# Patient Record
Sex: MALE | Race: WHITE | Employment: OTHER | URBAN - METROPOLITAN AREA
[De-identification: names, ages, dates, MRNs, and addresses within clinical notes are randomized per-mention and may not be internally consistent; named-entity substitution may affect disease eponyms.]

---

## 2020-06-04 ENCOUNTER — OFFICE VISIT (OUTPATIENT)
Dept: NEUROLOGY | Facility: CLINIC | Age: 75
End: 2020-06-04

## 2020-06-04 DIAGNOSIS — R03.0 ELEVATED BLOOD PRESSURE READING WITHOUT DIAGNOSIS OF HYPERTENSION: ICD-10-CM

## 2020-06-04 DIAGNOSIS — E78.00 HIGH BLOOD CHOLESTEROL: ICD-10-CM

## 2020-06-04 DIAGNOSIS — G25.0 ESSENTIAL TREMOR: Primary | ICD-10-CM

## 2020-06-04 DIAGNOSIS — E74.39 GLUCOSE INTOLERANCE: ICD-10-CM

## 2020-06-04 DIAGNOSIS — R25.1 TREMOR: ICD-10-CM

## 2020-06-04 RX ORDER — ENALAPRIL MALEATE 2.5 MG/1
2.5 TABLET ORAL EVERY MORNING
COMMUNITY

## 2020-06-04 RX ORDER — ATORVASTATIN CALCIUM 10 MG/1
10 TABLET, FILM COATED ORAL EVERY EVENING
COMMUNITY

## 2020-06-04 NOTE — PROGRESS NOTES
I changed the patient's scheduled in-person visit to a remote visit  because of the COVID19 crisis.  The rationale was to protect the patient from unnecessary interpersonal contact.    Referral source: Self-referred    Chief complaint: Essential tremor    History of present illness:  Mr. Duane Faymoville  is a 74-year-old right-handed retired gentleman who I met with remotely to discuss his interest in deep brain stimulation for essential tremor.    Mr. Bang cannot think of any parents, grandparents or siblings who had essential tremor.  His father  at a young age of 56.  He did not have tremor at that time.  None of his siblings have tremor. One of his sons does have tremor in the hands that began at age 30.    Ten years ago he began having a postural action tremor of his right arm.  This would increase with stress, fatigue, or late in the day.  He does not recognize head tremor.  He does have some voice tremor.  There is no tremor in the legs.  There is no alcohol alleviating affect.    He is gotten by with using strategies.  Handwriting is severely affected by tremor but he presses down hard and squeezes the pen and postures his hand to help with writing.  At times he may hold the right hand with the left.  His tremor is very prominent if he is eating and there are strangers about.  It is much worse if he feels people are paying attention to his tremor.  He finds that if he is talking and making gestures with his arm in social conversation his right arm will shake.  The tremor is now becoming disabling.  He has difficulty drinking from a cup and uses two hands.  He finds it hard to hold things.  He is very active and works on projects and is hard for him to hold a screwdriver or do manual projects with his right hand.  In eating he has difficulty using a fork.  He has moderate difficulty eating soup with a spoon.  He has difficulty putting a key in a lock.  He has a lot of difficulty buttoning clothing.  " He has difficulty picking up change from a vendor.  He says at this point the right arm tremor is disabling.  He states that he is strongly right-handed and has difficulty doing things now because of the right hand tremor.  Left hand tremor started a few years ago and is starting to increase but he does not find this causes disability.  He would be happy if his right arm was tremor free.  He says that he is \"sick of the tremor\".    He and his wife have read up on deep brain stimulation and are are interested in pursuing this.   is a good friend of mine and I have been discussing the possibility with him.  He says he is now at a point where he is interested in pursuing this.    In terms of parkinsonism he has no resting tremor.  His gait is normal.  His handwriting is somewhat small but it is because of the compensation where he squeezes his pencil tightly.  He really has no evidence of parkinsonism.    He has no cognitive difficulties.  There is no speech difficulty other than the mild tremor.  He is otherwise quite healthy.    Past medical history:  Mild glucose intolerance with diagnosis of diabetes mellitus 2  Mild hypertension  Mild hyperlipidemia    Surgeries  Right shoulder rotator cuff repair with good results.  Left arm fracture 2008  Herniorrhaphy  Compression fracture of back with negative biopsy at [facility]    Medications:  Metformin 500 mg a day  Enalapril 2.5 mg a day   Atorvastatin 10 mg a day    Allergies:  Pollens    Anesthetic events:  None    Family history:  Liver cancer Sister  Late life dementia mother    Physical exam:  With posture there is very little arm tremor.  However when the patient engages in kinetic tasks such as holding a cup or handwriting the tremor in the right hand becomes 3+.  In the left hand it is 2+.  The arm tremor seems somewhat postural when he holds his hand in a flexed posture more towards his face the tremor becomes 2+ on the right and 1+ on " the left.    There is no resting tremor.  There is no bradykinesia.    There is some mild vocal tremor.    With handwriting the tremor is 3+ using the right hand.  With spiral drawing the tremor is 3+ on the right and 1+ on the left.    Impression:  1.  Essential (familial) tremor  2.  Mild essential hypertension  3.  Mild diabetes mellitus 2  4.  History of mild hyperlipidemia    Recommendations:  1.  Mr. Bang and I had a long discussion about deep brain stimulation.  We talked about the probable results and relatively low risk.  We talked about the different devices and companies that make the devices and the pros and cons.  He will discuss this further with our neurosurgeon.  2.  He wouldt be a good candidate for left brain/right body DBS to see how he does with improved tremor in his strongly dominant right arm.  At present he does not feel that he would get much additional benefit with treatment of the left arm.  He might be a wait and see candidate.  3.  We discussed the DBS work-up but I will let Ms. Lisha Rodriguez RN, know that should we should start this.  I think he and his wife would like to attend the DBS class when it becomes available or if it becomes available online.    I will be available to Mr. Bang for any questions during his DBS work-up.  He notes at present this will likely involve a video visit with the neurosurgeon and a video visit with Dr. Chiara Haque.    Winston Dey MD

## 2020-06-04 NOTE — LETTER
Date:June 18, 2020      Patient was self referred, no letter generated. Do not send.        [facility] Information

## 2020-06-04 NOTE — LETTER
2020       RE: Duane Faymoville  [address] [place] [address]     Dear Colleague,    Thank you for referring your patient, Duane Faymoville, to the [facility] NEUROLOGY at [facility]. Please see a copy of my visit note below.    I changed the patient's scheduled in-person visit to a remote visit  because of the COVID19 crisis.  The rationale was to protect the patient from unnecessary interpersonal contact.    Referral source: Self-referred    Chief complaint: Essential tremor    History of present illness:  Mr. Duane  is a 74-year-old right-handed retired gentleman who I met with remotely to discuss his interest in deep brain stimulation for essential tremor.    Mr. Bang cannot think of any parents, grandparents or siblings who had essential tremor.  His father  at a young age of 56.  He did not have tremor at that time.  None of his siblings have tremor. One of his sons does have tremor in the hands that began at age 30.    Ten years ago he began having a postural action tremor of his right arm.  This would increase with stress, fatigue, or late in the day.  He does not recognize head tremor.  He does have some voice tremor.  There is no tremor in the legs.  There is no alcohol alleviating affect.    He is gotten by with using strategies.  Handwriting is severely affected by tremor but he presses down hard and squeezes the pen and postures his hand to help with writing.  At times he may hold the right hand with the left.  His tremor is very prominent if he is eating and there are strangers about.  It is much worse if he feels people are paying attention to his tremor.  He finds that if he is talking and making gestures with his arm in social conversation his right arm will shake.  The tremor is now becoming disabling.  He has difficulty drinking from a cup and uses two hands.  He finds it hard to hold things.  He is very active and  "works on projects and is hard for him to hold a screwdriver or do manual projects with his right hand.  In eating he has difficulty using a fork.  He has moderate difficulty eating soup with a spoon.  He has difficulty putting a key in a lock.  He has a lot of difficulty buttoning clothing.  He has difficulty picking up change from a vendor.  He says at this point the right arm tremor is disabling.  He states that he is strongly right-handed and has difficulty doing things now because of the right hand tremor.  Left hand tremor started a few years ago and is starting to increase but he does not find this causes disability.  He would be happy if his right arm was tremor free.  He says that he is \"sick of the tremor\".    He and his wife have read up on deep brain stimulation and are are interested in pursuing this.   is a good friend of mine and I have been discussing the possibility with him.  He says he is now at a point where he is interested in pursuing this.    In terms of parkinsonism he has no resting tremor.  His gait is normal.  His handwriting is somewhat small but it is because of the compensation where he squeezes his pencil tightly.  He really has no evidence of parkinsonism.    He has no cognitive difficulties.  There is no speech difficulty other than the mild tremor.  He is otherwise quite healthy.    Past medical history:  Mild glucose intolerance with diagnosis of diabetes mellitus 2  Mild hypertension  Mild hyperlipidemia    Surgeries  Right shoulder rotator cuff repair with good results.  Left arm fracture 2008  Herniorrhaphy  Compression fracture of back with negative biopsy at [facility]    Medications:  Metformin 500 mg a day  Enalapril 2.5 mg a day   Atorvastatin 10 mg a day    Allergies:  Pollens    Anesthetic events:  None    Family history:  Liver cancer Sister  Late life dementia mother    Physical exam:  With posture there is very little arm tremor.  However when " the patient engages in kinetic tasks such as holding a cup or handwriting the tremor in the right hand becomes 3+.  In the left hand it is 2+.  The arm tremor seems somewhat postural when he holds his hand in a flexed posture more towards his face the tremor becomes 2+ on the right and 1+ on the left.    There is no resting tremor.  There is no bradykinesia.    There is some mild vocal tremor.    With handwriting the tremor is 3+ using the right hand.  With spiral drawing the tremor is 3+ on the right and 1+ on the left.    Impression:  1.  Essential (familial) tremor  2.  Mild essential hypertension  3.  Mild diabetes mellitus 2  4.  History of mild hyperlipidemia    Recommendations:  1.  Mr. Bang and I had a long discussion about deep brain stimulation.  We talked about the probable results and relatively low risk.  We talked about the different devices and companies that make the devices and the pros and cons.  He will discuss this further with our neurosurgeon.  2.  He wouldt be a good candidate for left brain/right body DBS to see how he does with improved tremor in his strongly dominant right arm.  At present he does not feel that he would get much additional benefit with treatment of the left arm.  He might be a wait and see candidate.  3.  We discussed the DBS work-up but I will let Ms. Lisha Rodriguez RN, know that should we should start this.  I think he and his wife would like to attend the DBS class when it becomes available or if it becomes available online.    I will be available to Mr. Bang for any questions during his DBS work-up.  He notes at present this will likely involve a video visit with the neurosurgeon and a video visit with Dr. Chiara Haque.    Winston Dey MD    Again, thank you for allowing me to participate in the care of your patient.      Sincerely,    Winston Dey MD

## 2020-07-16 ENCOUNTER — PATIENT OUTREACH (OUTPATIENT)
Dept: NEUROSURGERY | Facility: CLINIC | Age: 75
End: 2020-07-16

## 2020-07-16 DIAGNOSIS — R25.1 TREMOR: Primary | ICD-10-CM

## 2020-07-16 NOTE — PROGRESS NOTES
Called pt to discuss scheduling DBS workup but got his VM. Left a message with my direct number and will try reaching pt tomorrow.     Spoke with pt to discuss the purpose of each of the workup appts and to discuss dates/times. August 3 and August 5 work well for neurosurgery consult, tremor rating scales, and MRI. I let pt know that the neuropsychological evaluation will be broken up into 2 parts: the interview portion with Dr. Haque, which can be done via a video visit, and the pen and paper testing with the psychometrist, which must be done in the clinic. Pt will get a call from the schedulers to schedule the 2 portions of this evaluation.     Will send a letter with full itinerary once appointments are scheduled. Pt is welcome to call with any questions or concerns. He has my direct number.

## 2020-07-16 NOTE — PROGRESS NOTES
Spoke with pt to discuss potential dates for DBS workup appts. However, he was driving and it was not a good time for him to talk. He did pull over and I gave him my direct contact information. He will call me when he is able to talk and look at his calendar.     I have the following dates in mind:    8/3/20 - consult with Dr. Mckeon (morning if pt prefers in person, afternoon if he prefers video)    8/5/20 at 9:00 - 10:10 - Tremor rating scales with Debbie Dagmar    MRI - will try to schedule for same day as tremor rating scales if 8/5 works for him.     Neuropsychological eval - will message Dr. Haque to schedule interview and to schedule balance of eval when her clinic is open to in-person appts.

## 2020-07-17 ENCOUNTER — DOCUMENTATION ONLY (OUTPATIENT)
Dept: CARE COORDINATION | Facility: CLINIC | Age: 75
End: 2020-07-17

## 2020-08-03 ENCOUNTER — OFFICE VISIT (OUTPATIENT)
Dept: NEUROSURGERY | Facility: CLINIC | Age: 75
End: 2020-08-03
Payer: MEDICARE

## 2020-08-03 ENCOUNTER — PRE VISIT (OUTPATIENT)
Dept: NEUROSURGERY | Facility: CLINIC | Age: 75
End: 2020-08-03

## 2020-08-03 VITALS
OXYGEN SATURATION: 97 % | HEART RATE: 65 BPM | RESPIRATION RATE: 16 BRPM | DIASTOLIC BLOOD PRESSURE: 74 MMHG | SYSTOLIC BLOOD PRESSURE: 157 MMHG

## 2020-08-03 DIAGNOSIS — R25.1 TREMOR: Primary | ICD-10-CM

## 2020-08-03 ASSESSMENT — PAIN SCALES - GENERAL: PAINLEVEL: NO PAIN (0)

## 2020-08-03 NOTE — LETTER
8/3/2020       RE: Duane Faymoville  [address] [place] [address]     Dear Colleague,    Thank you for referring your patient, Duane Faymoville, to the [facility] NEUROSURGERY at [facility]. Please see a copy of my visit note below.    NEUROSURGERY    HISTORY AND PHYSICAL EXAM    Chief Complaint   Patient presents with     Consult     P NEW, DBS CANDIDACY EVALUATION, ESSENTIAL TREMOR       HISTORY OF PRESENT ILLNESS  Mr. Duane Faymoville is a 74 year old right handed male who presents for DBS candidacy evaluation.  He is diagnosed with essential tremor and he is interested in deep brain stimulation for his tremors.  In his family, only one of his sons have tremor in the hands that began at age 30.  In terms of the patient, he began having postural action tremors of his right arm about 10 years ago.  The tremor was exacerbated by fatigue and stress/anxiety.  He does not have tremor in his legs and he does not have head tremor.  He is not sure if alcohol alleviates his tremor as he does not drink much.  His tremor has gotten worse as it now involves both hands.  However, it is worse on the right side or at least more noticeable on the right side.  He does have some coping strategies such as holding his arm to help him with writing.  His writing is severely affected.  He cannot hold objects and tools.  It is also affecting other ADLs.  The goal of the DBS surgery is to be able to write and be able to perform household book keeping.  He wants better quality of life overall.  He would like both sides treated but he would be more happy if his right arm was tremor free.      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, antepartum        Past Surgical History:   Procedure Laterality Date     COLONOSCOPY  06/01/2015     HERNIA REPAIR, UMBILICAL  10/01/2012     ORTHOPEDIC SURGERY Left     ORIF left forearm after fracture     PROSTATE SURGERY  03/01/2015     VASCULAR SURGERY Left 04/01/2012    left leg varicose vein surgery     VASCULAR SURGERY Right 03/01/2012    right leg varicose vein surgery       Family History   Problem Relation Age of Onset     Dementia Mother      Cancer Sister        Social History     Socioeconomic History     Marital status:      Spouse name: Not on file     Number of children: Not on file     Years of education: Not on file     Highest education level: Not on file   Occupational History     Not on file   Social Needs     Financial resource strain: Not on file     Food insecurity     Worry: Not on file     Inability: Not on file     Transportation needs     Medical: Not on file     Non-medical: Not on file   Tobacco Use     Smoking status: Never Smoker     Smokeless tobacco: Never Used   Substance and Sexual Activity     Alcohol use: Yes     Drug use: Not on file     Sexual activity: Not on file   Lifestyle     Physical activity     Days per week: Not on file     Minutes per session: Not on file     Stress: Not on file   Relationships     Social connections     Talks on phone: Not on file     Gets together: Not on file     Attends Orthodoxy service: Not on file     Active member of club or organization: Not on file     Attends meetings of clubs or organizations: Not on file     Relationship status: Not on file     Intimate partner violence     Fear of current or ex partner: Not on file     Emotionally abused: Not on file     Physically abused: Not on file     Forced sexual activity: Not on file   Other Topics Concern     Parent/sibling w/ CABG, MI or angioplasty before 65F 55M?  Not Asked   Social History Narrative     Not on file        No Known Allergies    Current Outpatient Medications   Medication     atorvastatin (LIPITOR) 10 MG tablet     enalapril (VASOTEC) 2.5 MG tablet     metFORMIN (GLUCOPHAGE) 500 MG tablet     No current facility-administered medications for this visit.          REVIEW OF SYSTEMS:  General: Negative for chills/sweats/fever, difficulty sleeping, headache, recent fatigue, or weight gain/loss.  Eyes: Negative for blurred vision, crossed eyes, double vision, recent eye infections, vision flashes, or vision halos.  Ears/Nose/Mouth/Throat: Negative for bleeding gums, difficulty swallowing, earache, ear discharge, hearing loss, hoarseness, nosebleeds, tinnitus, or sinus problems.  Respiratory:Negative for chronic cough, coughing blood, night sweats, shortness of breath, Tuberculosis, or wheezing.  Cardiovascular: Negative for chest pain, dyspnea at night, heart murmur, palpitations, pacemaker, pacemaker, poor circulation, swollen legs/feet, or varicose veins.  Gastrointestinal: POSITIVE for constipation and peptic ulcer disease.  Negative for melena, hematochezia, chronic diarrhea, heartburn, Hepatitis A/B/C, Liver Disease, nausea, or vomiting.   Genitourinary: POSITIVE for erectile dysfunction and prostate problems s/p prostectomy.  Negative for Urinary retention, genital discharge, urinary incontinence, urgency, or UTI.   Neurological: POSITIVE for tremors.  Negative for syncope, headaches, numbness of arms/legs, tingling in hands/arms/legs, memory problems, or seizures.  Psychological: Negative for anxiety, depression, panic attacks, or restlessness.  Skin: Negative for chronic skin itching, color changes in hand/feet when cold, poor scarring, non-healing ulcers, skin rashes/hives, unusual moles.  Musculoskeletal: Negative for arthritis, joint swelling in hands/wrists/hips/knees/joints, muscle tenderness in arms/legs, or osteoporosis.  Endocrine: Negative for  excessive thirst/hunger, intolerance for warm rooms, loss of libido, multiple broken bones, rapid weight gain/loss, galactorrhea, or thyroid issues.  Hematologic/Lymphatic: Negative for easy skin bruising, significant fatigue, prolonged bleeding, tender glands/lymph nodes.  Allergies: Negative for asthma or hay fever.      PHYSICAL EXAM  BP (!) 157/74   Pulse 65   Resp 16   SpO2 97%     General: Awake, alert, oriented. Well nourished, well developed, no acute distress.  HEENT: Head normocephalic, atraumatic. No carotid bruit. Neck supple. Good range of motion. No palpable thyroid mass.  Heart: Regular rhythm and rate. No murmurs.  Lungs: Clear to auscultation and percussion bilaterally. No rhonchi, rales or wheeze.  Abdomen: Soft, non-tender, non-distended. No hepatosplenomegaly.  Extremity: Warm with no clubbing or cyanosis. No lower extremity edema.    Neurological:  Awake, alert and oriented to date, time, place and person. Speech fluent.   Pupils equal, round, reactive to light.  Extraocular movement intact.  Visual fields are full on confrontation.  Hearing is grossly normal to finger rub.   Facial sensation intact.  Face symmetric.  Tongue midline.  Uvula elevates equally.    Motor: full strength throughout.  Sensation: intact to light touch and pinpoint.  Deep tendon reflexes: 1+ throughout. Negative for clonus. Negative for Rodríguez's sign. No dysmetria.  Some tremor that becomes more noticeable when he is writing.  Minimal resting tremor.    ASSESSMENT   74 year old right handed male with essential tremor.    Mr. Bang presents for consideration of DBS for his essential tremor.  His tremor is affecting his overall quality of life, as it is affecting most of the ADLs.  He is wanting to have the tremor controlled so that he can write and hold tools.  He would like both sides treated.    Since his tremor is worse on the right side, it is likely that he would have his left side Vim implanted first, if he  is considered to be a candidate.  MRI of the brain is pending.    During today's visit, we discussed both phase I and II of DBS surgery.  We discussed that during phase I, we would place the DBS electrode on the left side under MAC and microelectrode recording.  He would then return one week later for the phase II with placement of the DBS generator/battery over the left chest wall under general anesthesia.  If he is a unilateral candidate or wait and see strategy candidate, then he will undergo another evaluation/discussion prior to proceeding to the right side implantation.  If he is a bilateral candidate in a staged fashion, he would return three weeks later for the phase I and II combined for the placement of the DBS electrode on the right side under MAC and microelectrode recording followed by connection to the previously implanted generator/battery.     Briefly, following topic was discussed.    Medtronic  MRI compatible.  Non-rechargeable and rechargeable generator/battery available.  4 contact electrode.  Communication method: have the  or patient controller on the skin directly over the generator/battery.    A[employer]ott  MRI compatible.  Non-rechargeable generator/battery.  8 contact segmented/directional electrode.  Communication method: Wireless/bluetooth.    Kaiam  Rechargeable generator/battery is MRI compatible.    Non-rechargeable will never be MRI compatible.  Rechargeable and non-rechargeable generator/battery.  8 contact electrode.  Ring contacts or segmented contacts.  Independent current control at each contact.  Communication method: Wireless.    I did discuss that the implant technique for these devices are relatively the same which was discussed again.  They all have similar risks associated with the surgery.    We did discuss that currently Medtronic and Abbott are the devices indicated for essential tremor.  Patient is ok with rechargeable system.    Risks, benefits,  alternative therapies were discussed with the patient, including but not limited to infection and bleeding (intracranial), injury to the brain, stroke, death, hardware failure and possible need for more surgeries.  Surgical procedure was discussed in detail.  All questions were answered, and he expressed understanding.     A full history and physical exam was performed during today's visit.  His case will be discussed at the Movement Disorder Consensus Group Meeting to determine whether he is a good candidate for DBS surgery.      PLAN:  1.  Complete the DBS workup, including MRI brain without/with gadolinium.  This is scheduled.  2.  We will discuss his case at the Movement Disorder Consensus Group Meeting, and we will contact him regarding his DBS candidacy.     65 minutes were spent face to face with the patient of which more than 50% of the time was spent counseling and discussing the above issues regarding diagnosis, surgical and device options, and steps for further evaluation.    Again, thank you for allowing me to participate in the care of your patient.  Sincerely,    Darin Mckeon MD

## 2020-08-03 NOTE — PROGRESS NOTES
NEUROSURGERY    HISTORY AND PHYSICAL EXAM    Chief Complaint   Patient presents with     Consult     [facility] NEW, DBS CANDIDACY EVALUATION, ESSENTIAL TREMOR       HISTORY OF PRESENT ILLNESS  Mr. Duane Faymoville is a 74 year old right handed male who presents for DBS candidacy evaluation.  He is diagnosed with essential tremor and he is interested in deep brain stimulation for his tremors.  In his family, only one of his sons have tremor in the hands that began at age 30.  In terms of the patient, he began having postural action tremors of his right arm about 10 years ago.  The tremor was exacerbated by fatigue and stress/anxiety.  He does not have tremor in his legs and he does not have head tremor.  He is not sure if alcohol alleviates his tremor as he does not drink much.  His tremor has gotten worse as it now involves both hands.  However, it is worse on the right side or at least more noticeable on the right side.  He does have some coping strategies such as holding his arm to help him with writing.  His writing is severely affected.  He cannot hold objects and tools.  It is also affecting other ADLs.  The goal of the DBS surgery is to be able to write and be able to perform household book keeping.  He wants better quality of life overall.  He would like both sides treated but he would be more happy if his right arm was tremor free.      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, antepartum        Past Surgical History:   Procedure Laterality Date     COLONOSCOPY  06/01/2015     HERNIA REPAIR, UMBILICAL  10/01/2012     ORTHOPEDIC SURGERY Left     ORIF left  forearm after fracture     PROSTATE SURGERY  03/01/2015     VASCULAR SURGERY Left 04/01/2012    left leg varicose vein surgery     VASCULAR SURGERY Right 03/01/2012    right leg varicose vein surgery       Family History   Problem Relation Age of Onset     Dementia Mother      Cancer Sister        Social History     Socioeconomic History     Marital status:      Spouse name: Not on file     Number of children: Not on file     Years of education: Not on file     Highest education level: Not on file   Occupational History     Not on file   Social Needs     Financial resource strain: Not on file     Food insecurity     Worry: Not on file     Inability: Not on file     Transportation needs     Medical: Not on file     Non-medical: Not on file   Tobacco Use     Smoking status: Never Smoker     Smokeless tobacco: Never Used   Substance and Sexual Activity     Alcohol use: Yes     Drug use: Not on file     Sexual activity: Not on file   Lifestyle     Physical activity     Days per week: Not on file     Minutes per session: Not on file     Stress: Not on file   Relationships     Social connections     Talks on phone: Not on file     Gets together: Not on file     Attends [employer] service: Not on file     Active member of club or organization: Not on file     Attends meetings of clubs or organizations: Not on file     Relationship status: Not on file     Intimate partner violence     Fear of current or ex partner: Not on file     Emotionally abused: Not on file     Physically abused: Not on file     Forced sexual activity: Not on file   Other Topics Concern     Parent/sibling w/ CABG, MI or angioplasty before 65F 55M? Not Asked   Social History Narrative     Not on file        No Known Allergies    Current Outpatient Medications   Medication     atorvastatin (LIPITOR) 10 MG tablet     enalapril (VASOTEC) 2.5 MG tablet     metFORMIN (GLUCOPHAGE) 500 MG tablet     No current facility-administered medications for this  visit.          REVIEW OF SYSTEMS:  General: Negative for chills/sweats/fever, difficulty sleeping, headache, recent fatigue, or weight gain/loss.  Eyes: Negative for blurred vision, crossed eyes, double vision, recent eye infections, vision flashes, or vision halos.  Ears/Nose/Mouth/Throat: Negative for bleeding gums, difficulty swallowing, earache, ear discharge, hearing loss, hoarseness, nosebleeds, tinnitus, or sinus problems.  Respiratory:Negative for chronic cough, coughing blood, night sweats, shortness of breath, Tuberculosis, or wheezing.  Cardiovascular: Negative for chest pain, dyspnea at night, heart murmur, palpitations, pacemaker, pacemaker, poor circulation, swollen legs/feet, or varicose veins.  Gastrointestinal: POSITIVE for constipation and peptic ulcer disease.  Negative for melena, hematochezia, chronic diarrhea, heartburn, Hepatitis A/B/C, Liver Disease, nausea, or vomiting.   Genitourinary: POSITIVE for erectile dysfunction and prostate problems s/p prostectomy.  Negative for Urinary retention, genital discharge, urinary incontinence, urgency, or UTI.   Neurological: POSITIVE for tremors.  Negative for syncope, headaches, numbness of arms/legs, tingling in hands/arms/legs, memory problems, or seizures.  Psychological: Negative for anxiety, depression, panic attacks, or restlessness.  Skin: Negative for chronic skin itching, color changes in hand/feet when cold, poor scarring, non-healing ulcers, skin rashes/hives, unusual moles.  Musculoskeletal: Negative for arthritis, joint swelling in hands/wrists/hips/knees/joints, muscle tenderness in arms/legs, or osteoporosis.  Endocrine: Negative for excessive thirst/hunger, intolerance for warm rooms, loss of libido, multiple broken bones, rapid weight gain/loss, galactorrhea, or thyroid issues.  Hematologic/Lymphatic: Negative for easy skin bruising, significant fatigue, prolonged bleeding, tender glands/lymph nodes.  Allergies: Negative for asthma or  hay fever.      PHYSICAL EXAM  BP (!) 157/74   Pulse 65   Resp 16   SpO2 97%     General: Awake, alert, oriented. Well nourished, well developed, no acute distress.  HEENT: Head normocephalic, atraumatic. No carotid bruit. Neck supple. Good range of motion. No palpable thyroid mass.  Heart: Regular rhythm and rate. No murmurs.  Lungs: Clear to auscultation and percussion bilaterally. No rhonchi, rales or wheeze.  Abdomen: Soft, non-tender, non-distended. No hepatosplenomegaly.  Extremity: Warm with no clubbing or cyanosis. No lower extremity edema.    Neurological:  Awake, alert and oriented to date, time, place and person. Speech fluent.   Pupils equal, round, reactive to light.  Extraocular movement intact.  Visual fields are full on confrontation.  Hearing is grossly normal to finger rub.   Facial sensation intact.  Face symmetric.  Tongue midline.  Uvula elevates equally.    Motor: full strength throughout.  Sensation: intact to light touch and pinpoint.  Deep tendon reflexes: 1+ throughout. Negative for clonus. Negative for Rodríguez's sign. No dysmetria.  Some tremor that becomes more noticeable when he is writing.  Minimal resting tremor.      ASSESSMENT   74 year old right handed male with essential tremor.    Mr. Bang presents for consideration of DBS for his essential tremor.  His tremor is affecting his overall quality of life, as it is affecting most of the ADLs.  He is wanting to have the tremor controlled so that he can write and hold tools.  He would like both sides treated.    Since his tremor is worse on the right side, it is likely that he would have his left side Vim implanted first, if he is considered to be a candidate.  MRI of the brain is pending.    During today's visit, we discussed both phase I and II of DBS surgery.  We discussed that during phase I, we would place the DBS electrode on the left side under MAC and microelectrode recording.  He would then return one week later for the  phase II with placement of the DBS generator/battery over the left chest wall under general anesthesia.  If he is a unilateral candidate or wait and see strategy candidate, then he will undergo another evaluation/discussion prior to proceeding to the right side implantation.  If he is a bilateral candidate in a staged fashion, he would return three weeks later for the phase I and II combined for the placement of the DBS electrode on the right side under MAC and microelectrode recording followed by connection to the previously implanted generator/battery.     Briefly, following topic was discussed.    Medtronic  MRI compatible.  Non-rechargeable and rechargeable generator/battery available.  4 contact electrode.  Communication method: have the  or patient controller on the skin directly over the generator/battery.    [employer]ott  MRI compatible.  Non-rechargeable generator/battery.  8 contact segmented/directional electrode.  Communication method: Wireless/bluetooth.    [employer]  Rechargeable generator/battery is MRI compatible.    Non-rechargeable will never be MRI compatible.  Rechargeable and non-rechargeable generator/battery.  8 contact electrode.  Ring contacts or segmented contacts.  Independent current control at each contact.  Communication method: Wireless.    I did discuss that the implant technique for these devices are relatively the same which was discussed again.  They all have similar risks associated with the surgery.    We did discuss that currently Medtronic and [employer] are the devices indicated for essential tremor.  Patient is ok with rechargeable system.    Risks, benefits, alternative therapies were discussed with the patient, including but not limited to infection and bleeding (intracranial), injury to the brain, stroke, death, hardware failure and possible need for more surgeries.  Surgical procedure was discussed in detail.  All questions were answered, and he expressed  understanding.     A full history and physical exam was performed during today's visit.  His case will be discussed at the Movement Disorder Consensus Group Meeting to determine whether he is a good candidate for DBS surgery.      PLAN:  1.  Complete the DBS workup, including MRI brain without/with gadolinium.  This is scheduled.  2.  We will discuss his case at the Movement Disorder Consensus Group Meeting, and we will contact him regarding his DBS candidacy.       65 minutes were spent face to face with the patient of which more than 50% of the time was spent counseling and discussing the above issues regarding diagnosis, surgical and device options, and steps for further evaluation.

## 2020-08-03 NOTE — LETTER
8/3/2020       RE: Duane Faymoville  [address] [place] [address]     Dear Colleague,    Thank you for referring your patient, Duane Faymoville, to the [facility] NEUROSURGERY at [facility]. Please see a copy of my visit note below.    NEUROSURGERY    HISTORY AND PHYSICAL EXAM    Chief Complaint   Patient presents with     Consult     P NEW, DBS CANDIDACY EVALUATION, ESSENTIAL TREMOR       HISTORY OF PRESENT ILLNESS  Mr. Duane Faymoville is a 74 year old right handed male who presents for DBS candidacy evaluation.  He is diagnosed with essential tremor and he is interested in deep brain stimulation for his tremors.  In his family, only one of his sons have tremor in the hands that began at age 30.  In terms of the patient, he began having postural action tremors of his right arm about 10 years ago.  The tremor was exacerbated by fatigue and stress/anxiety.  He does not have tremor in his legs and he does not have head tremor.  He is not sure if alcohol alleviates his tremor as he does not drink much.  His tremor has gotten worse as it now involves both hands.  However, it is worse on the right side or at least more noticeable on the right side.  He does have some coping strategies such as holding his arm to help him with writing.  His writing is severely affected.  He cannot hold objects and tools.  It is also affecting other ADLs.  The goal of the DBS surgery is to be able to write and be able to perform household book keeping.  He wants better quality of life overall.  He would like both sides treated but he would be more happy if his right arm was tremor free.      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, antepartum        Past Surgical History:   Procedure Laterality Date     COLONOSCOPY  06/01/2015     HERNIA REPAIR, UMBILICAL  10/01/2012     ORTHOPEDIC SURGERY Left     ORIF left forearm after fracture     PROSTATE SURGERY  03/01/2015     VASCULAR SURGERY Left 04/01/2012    left leg varicose vein surgery     VASCULAR SURGERY Right 03/01/2012    right leg varicose vein surgery       Family History   Problem Relation Age of Onset     Dementia Mother      Cancer Sister        Social History     Socioeconomic History     Marital status:      Spouse name: Not on file     Number of children: Not on file     Years of education: Not on file     Highest education level: Not on file   Occupational History     Not on file   Social Needs     Financial resource strain: Not on file     Food insecurity     Worry: Not on file     Inability: Not on file     Transportation needs     Medical: Not on file     Non-medical: Not on file   Tobacco Use     Smoking status: Never Smoker     Smokeless tobacco: Never Used   Substance and Sexual Activity     Alcohol use: Yes     Drug use: Not on file     Sexual activity: Not on file   Lifestyle     Physical activity     Days per week: Not on file     Minutes per session: Not on file     Stress: Not on file   Relationships     Social connections     Talks on phone: Not on file     Gets together: Not on file     Attends [employer] service: Not on file     Active member of club or organization: Not on file     Attends meetings of clubs or organizations: Not on file     Relationship status: Not on file     Intimate partner violence     Fear of current or ex partner: Not on file     Emotionally abused: Not on file     Physically abused: Not on file     Forced sexual activity: Not on file   Other Topics Concern     Parent/sibling w/ CABG, MI or angioplasty before 65F 55M?  Not Asked   Social History Narrative     Not on file        No Known Allergies    Current Outpatient Medications   Medication     atorvastatin (LIPITOR) 10 MG tablet     enalapril (VASOTEC) 2.5 MG tablet     metFORMIN (GLUCOPHAGE) 500 MG tablet     No current facility-administered medications for this visit.          REVIEW OF SYSTEMS:  General: Negative for chills/sweats/fever, difficulty sleeping, headache, recent fatigue, or weight gain/loss.  Eyes: Negative for blurred vision, crossed eyes, double vision, recent eye infections, vision flashes, or vision halos.  Ears/Nose/Mouth/Throat: Negative for bleeding gums, difficulty swallowing, earache, ear discharge, hearing loss, hoarseness, nosebleeds, tinnitus, or sinus problems.  Respiratory:Negative for chronic cough, coughing blood, night sweats, shortness of breath, Tuberculosis, or wheezing.  Cardiovascular: Negative for chest pain, dyspnea at night, heart murmur, palpitations, pacemaker, pacemaker, poor circulation, swollen legs/feet, or varicose veins.  Gastrointestinal: POSITIVE for constipation and peptic ulcer disease.  Negative for melena, hematochezia, chronic diarrhea, heartburn, Hepatitis A/B/C, Liver Disease, nausea, or vomiting.   Genitourinary: POSITIVE for erectile dysfunction and prostate problems s/p prostectomy.  Negative for Urinary retention, genital discharge, urinary incontinence, urgency, or UTI.   Neurological: POSITIVE for tremors.  Negative for syncope, headaches, numbness of arms/legs, tingling in hands/arms/legs, memory problems, or seizures.  Psychological: Negative for anxiety, depression, panic attacks, or restlessness.  Skin: Negative for chronic skin itching, color changes in hand/feet when cold, poor scarring, non-healing ulcers, skin rashes/hives, unusual moles.  Musculoskeletal: Negative for arthritis, joint swelling in hands/wrists/hips/knees/joints, muscle tenderness in arms/legs, or osteoporosis.  Endocrine: Negative for  excessive thirst/hunger, intolerance for warm rooms, loss of libido, multiple broken bones, rapid weight gain/loss, galactorrhea, or thyroid issues.  Hematologic/Lymphatic: Negative for easy skin bruising, significant fatigue, prolonged bleeding, tender glands/lymph nodes.  Allergies: Negative for asthma or hay fever.      PHYSICAL EXAM  BP (!) 157/74   Pulse 65   Resp 16   SpO2 97%     General: Awake, alert, oriented. Well nourished, well developed, no acute distress.  HEENT: Head normocephalic, atraumatic. No carotid bruit. Neck supple. Good range of motion. No palpable thyroid mass.  Heart: Regular rhythm and rate. No murmurs.  Lungs: Clear to auscultation and percussion bilaterally. No rhonchi, rales or wheeze.  Abdomen: Soft, non-tender, non-distended. No hepatosplenomegaly.  Extremity: Warm with no clubbing or cyanosis. No lower extremity edema.    Neurological:  Awake, alert and oriented to date, time, place and person. Speech fluent.   Pupils equal, round, reactive to light.  Extraocular movement intact.  Visual fields are full on confrontation.  Hearing is grossly normal to finger rub.   Facial sensation intact.  Face symmetric.  Tongue midline.  Uvula elevates equally.    Motor: full strength throughout.  Sensation: intact to light touch and pinpoint.  Deep tendon reflexes: 1+ throughout. Negative for clonus. Negative for Rodríguez's sign. No dysmetria.  Some tremor that becomes more noticeable when he is writing.  Minimal resting tremor.      ASSESSMENT   74 year old right handed male with essential tremor.    Mr. Bang presents for consideration of DBS for his essential tremor.  His tremor is affecting his overall quality of life, as it is affecting most of the ADLs.  He is wanting to have the tremor controlled so that he can write and hold tools.  He would like both sides treated.    Since his tremor is worse on the right side, it is likely that he would have his left side Vim implanted first, if  he is considered to be a candidate.  MRI of the brain is pending.    During today's visit, we discussed both phase I and II of DBS surgery.  We discussed that during phase I, we would place the DBS electrode on the left side under MAC and microelectrode recording.  He would then return one week later for the phase II with placement of the DBS generator/battery over the left chest wall under general anesthesia.  If he is a unilateral candidate or wait and see strategy candidate, then he will undergo another evaluation/discussion prior to proceeding to the right side implantation.  If he is a bilateral candidate in a staged fashion, he would return three weeks later for the phase I and II combined for the placement of the DBS electrode on the right side under MAC and microelectrode recording followed by connection to the previously implanted generator/battery.     Briefly, following topic was discussed.    Medtronic  MRI compatible.  Non-rechargeable and rechargeable generator/battery available.  4 contact electrode.  Communication method: have the  or patient controller on the skin directly over the generator/battery.    [employer]  MRI compatible.  Non-rechargeable generator/battery.  8 contact segmented/directional electrode.  Communication method: Wireless/bluetooth.    [employer]  Rechargeable generator/battery is MRI compatible.    Non-rechargeable will never be MRI compatible.  Rechargeable and non-rechargeable generator/battery.  8 contact electrode.  Ring contacts or segmented contacts.  Independent current control at each contact.  Communication method: Wireless.    I did discuss that the implant technique for these devices are relatively the same which was discussed again.  They all have similar risks associated with the surgery.    We did discuss that currently Medtronic and [employer] are the devices indicated for essential tremor.  Patient is ok with rechargeable system.    Risks, benefits,  alternative therapies were discussed with the patient, including but not limited to infection and bleeding (intracranial), injury to the brain, stroke, death, hardware failure and possible need for more surgeries.  Surgical procedure was discussed in detail.  All questions were answered, and he expressed understanding.     A full history and physical exam was performed during today's visit.  His case will be discussed at the Movement Disorder Consensus Group Meeting to determine whether he is a good candidate for DBS surgery.      PLAN:  1.  Complete the DBS workup, including MRI brain without/with gadolinium.  This is scheduled.  2.  We will discuss his case at the Movement Disorder Consensus Group Meeting, and we will contact him regarding his DBS candidacy.       65 minutes were spent face to face with the patient of which more than 50% of the time was spent counseling and discussing the above issues regarding diagnosis, surgical and device options, and steps for further evaluation.    Again, thank you for allowing me to participate in the care of your patient.      Sincerely,    Darin Mckeon MD

## 2020-08-03 NOTE — TELEPHONE ENCOUNTER
FUTURE VISIT INFORMATION      FUTURE VISIT INFORMATION:    Date: 8/3/2020    Time: 930am    Location: [facility]  REFERRAL INFORMATION:    Referring provider:      Referring providers clinic:      Reason for visit/diagnosis  DBS     RECORDS REQUESTED FROM:       Clinic name Comments Records Status Imaging Status   Internal Dr. Dey-6/4/2020    MR Brain Scheduled 8/5/2020 Epic Will be in PACS

## 2020-08-05 ENCOUNTER — ANCILLARY PROCEDURE (OUTPATIENT)
Dept: MRI IMAGING | Facility: CLINIC | Age: 75
End: 2020-08-05
Attending: PSYCHIATRY & NEUROLOGY
Payer: MEDICARE

## 2020-08-05 ENCOUNTER — OFFICE VISIT (OUTPATIENT)
Dept: NEUROLOGY | Facility: CLINIC | Age: 75
End: 2020-08-05
Payer: MEDICARE

## 2020-08-05 VITALS
DIASTOLIC BLOOD PRESSURE: 73 MMHG | WEIGHT: 196.8 LBS | OXYGEN SATURATION: 97 % | HEART RATE: 60 BPM | SYSTOLIC BLOOD PRESSURE: 146 MMHG

## 2020-08-05 DIAGNOSIS — R25.1 TREMOR: ICD-10-CM

## 2020-08-05 DIAGNOSIS — G25.0 ESSENTIAL TREMOR: Primary | ICD-10-CM

## 2020-08-05 RX ORDER — GADOBUTROL 604.72 MG/ML
10 INJECTION INTRAVENOUS ONCE
Status: COMPLETED | OUTPATIENT
Start: 2020-08-05 | End: 2020-08-05

## 2020-08-05 RX ADMIN — GADOBUTROL 8.9 ML: 604.72 INJECTION INTRAVENOUS at 11:40

## 2020-08-05 ASSESSMENT — ACTIVITIES OF DAILY LIVING (ADL)
TOTAL_SCORE: 31
HYGIENE: (2) MILDLY ABNORMAL. SOME DIFFICULTY BUT CAN COMPLETE TASK.
CARRYING_FOOD_TRAYS_PLATES_OR_SIMILAR_ITEMS: (3) MODERATELY ABNORMAL. USES STRATEGIES SUCH AS HOLDING TIGHTLY AGAINST BODY TO CARRY.
SOCIAL_IMPACT: (3) AVOIDS PARTICIPATING IN SOME SOCIAL SITUATIONS OR PROFESSIONAL MEETINGS BECAUSE OF TREMOR.
POURING: (3) MODERATELY ABNORMAL. MUST USE TWO HANDS OR USES OTHER STRATEGIES TO AVOID SPILLING.
OVERALL_DISABILITY_WITH_THE_MOST_AFFECTED_TASK: (4) SEVERELY ABNORMAL. CANNOT DO THE TASK.
FEEDING_WITH_A_SPOON: (2) MILDLY ABNORMAL. SPILLS A LITTLE.
DRESS: (1) SLIGHTLY ABNORMAL. TREMOR IS PRESENT BUT DOES NOT INTERFERE WITH DRESSING.
WRITING: (4) SEVERELY ABNORMAL. CANNOT WRITE.
USING_KEYS: (3) MODERATELY ABNORMAL. NEEDS TO USE TWO HANDS OR OTHER STRATEGIES TO PUT KEY IN LOCK.
WORKING: (3) MODERATELY ABNORMAL. UNABLE TO CONTINUE WORKING WITHOUT USING STRATEGIES SUCH AS CHANGING JOBS OR USING SPECIAL EQUIPMENT.
DRINKING_FROM_A_GLASS: (3) MODERATELY ABNORMAL. SPILLS A LOT OR CHANGES STRATEGY TO COMPLETE TASK SUCH AS USING TWO HANDS OR LEANING OVER.
OVERALL_DISABILITY_WITH_THE_MOST_AFFECTED_TASK: WRITING
SPEAKING: (0) NORMAL

## 2020-08-05 ASSESSMENT — PAIN SCALES - GENERAL: PAINLEVEL: NO PAIN (0)

## 2020-08-05 NOTE — NURSING NOTE
Chief Complaint   Patient presents with     RECHECK     [facility] RETURN - TREMOR RATING SCALE       Preventive Care:    Colorectal Cancer Screening: During our visit today, we discussed that it is recommended you receive colorectal cancer screening. Please call or make an appointment with your primary care provider to discuss this. You may also call the [facility] scheduling line ([phone]) to set up a colonoscopy appointment.      Lukas Navarrete, EMT

## 2020-08-05 NOTE — PATIENT INSTRUCTIONS
August 5, 2020    Dear Mr. Duane Faymoville,    Thank you for coming today.  During your visit, we have discussed the following:     __  You have completed the Tremor Rating Scale.   __  Continue with your DBS workup appointments.  __  Once you're done with your workup, we'll discuss you at the DBS Consensus meeting & will let you know the decision.    For questions, you may send us a [employer] message or call [phone]    Fax number: [phone]    BENJIE Whaley, CNP  [facility]

## 2020-08-05 NOTE — PROGRESS NOTES
"MOVEMENT DISORDERS CLINIC    PATIENT: Duane Faymoville    : 1945    RICHARD: 2020    REASON FOR VISIT: For evaluation of tremor using a rating scale.     HPI:  Mr. Duane Faymoville is a 74 year old right - handed  male who came to the [facility] neurology clinic by himself for tremor evaluation as part of his Deep Brain Stimulation (DBS) surgery work-up for management of Essential Tremor (ET).    Tremors started about 10 years ago in both hands; Rt > Lt.  He was diagnosed with ET in 2015.  He has been tried on various medications, which he couldn't recall.  He recalls having \"too many side effects or totally ineffective.\" Currently, he is not on any medication to help his tremors.     Goal for DBS:  When tremors gets worse, being able to write a check, pay bills.  Tremors are worse in the evening. Eating meals with friend and family is fine, but if there are strangers or at restaurants, the tremor gets worse.      He is interested to have both side of his body treated first.     Knowledge about DBS:  He recalls 1 - 2 % of side effects and was able to state possible bleeding.    MEDICATIONS for TREMOR  None       Vital Signs:  Blood pressure (!) 146/73, pulse 60, weight 89.3 kg (196 lb 12.8 oz), SpO2 97 %.      Videotaping Consent Obtained:  Patient refused.  He gave verbal consent that he only wants the team to view the video.  Exam was videotaped.    TRG Essential Tremor Rating Assessment Scale (TETRAS) V 3.1    Activities of Daily Living Subscale: (Maximum Score = 48)  (0=Normal 1=Slightly abnormal 2=Mildly 3=Moderately 4= Severely)  ADL Sub-Scale 2020   1. Speaking 0   2. Feeding with a spoon 2   3. Drinking from a glass 3   4. Hygiene 2   5. Dressing 1   6. Pouring 3   7. Carrying food trays, plates or similar items 3   8. Using keys 3   9. Writing 4   10. Working 3   11. Overall disability with the most affected task 4   Name of most affected task: Writing   12. Social impact 3   ADL TOTAL " 31       Performance Subscale (Maximum score = 64)  (0=No tremor 1=Slight 2=Mild 3=Moderate 4= Severe)  Motor (Performance) Sub-Scale 8/5/2020   Assessment Time 9:07 AM   Medication None   DBS - Right Brain None   DBS - Left Brain None   Head 0   Face & Jaw 0   Voice 1   Outstretched - RIGHT 2   Outstretched - LEFT 0   Wingbeating - RIGHT 1.5   Wingbeating - LEFT 0   Kinetic - RIGHT 2   Kinetic - LEFT 2   Lower Limb - RIGHT 1   Lower Limb - LEFT 1   Lower Limb (Max) 1   Spiral - RIGHT 3   Spiral - LEFT 1   Handwriting 4   Dot approx - RIGHT 2   Dot approx - LEFT 2   Trunk (Standing) 0   Total Right 11.5   Total Left 6   Axial 1   TOTAL 21.5       ASSESSMENT/PLAN:    Essential Tremor: Mr. Bang is a 74 year old right - handed male with a 10 year history of tremors who came to the clinic for tremor evaluation as part of his Deep Brain Stimulation surgery work-up.    Today's visit has shown abnormal facial movements with mental activation.  Patient is not aware of these movements.     __  Pt had good knowledge about DBS.  We discussed the highlights of DBS procedure, risks & benefits; the possibility of 1 - 3 % serious side effects including infection, bleeding, stroke, cognitive & speech impairment, seizures, . . . . & death; the possibility of lead misplacement; appropriate DBS candidates; and DBS programming & the need to return to clinic for reprogramming.  All questions were answered.    __ He was informed that we'll contact him after we discuss his work-up at the DBS Consensus Meeting.  He understands the plan.    The total time spent with the patient in tremor evaluation using a rating scale was 70 minutes and greater than 50% of the time was spent in counseling and coordination of care.    Debbie Leavitt, APRN, CNP  [facility]

## 2020-08-05 NOTE — Clinical Note
"2020       RE: Duane Faymoville  [address] [place] [address]     Dear Colleague,    Thank you for referring your patient, Duane Faymoville, to the [facility] NEUROLOGY at [facility]. Please see a copy of my visit note below.    MOVEMENT DISORDERS CLINIC    PATIENT: Duane Faymoville    : 1945    RICHARD: 2020    REASON FOR VISIT: For evaluation of tremor using a rating scale.     HPI:  Mr. Duane Faymoville is a 74 year old right - handed  male who came to the [facility] neurology clinic by himself for tremor evaluation as part of his Deep Brain Stimulation (DBS) surgery work-up for management of Essential Tremor (ET).    Tremors started about 10 years ago in both hands; Rt > Lt.  He was diagnosed with ET in 2015.  He has been tried on various medications, which he couldn't recall.  He recalls having \"too many side effects or totally ineffective.\" Currently, he is not on any medication to help his tremors.     Goal for DBS:  When tremors gets worse, being able to write a check, pay bills.  Tremors are worse in the evening. Eating meals with friend and family is fine, but if there are strangers or at restaurants, the tremor gets worse.      He is interested to have both side of his body treated first.     Knowledge about DBS:  He recalls 1 - 2 % of side effects and was able to state possible bleeding.    MEDICATIONS for TREMOR  None       Vital Signs:  Blood pressure (!) 146/73, pulse 60, weight 89.3 kg (196 lb 12.8 oz), SpO2 97 %.      Videotaping Consent Obtained:  Patient refused.  He gave verbal consent that he only wants the team to view the video.  Exam was videotaped.    TRG Essential Tremor Rating Assessment Scale (TETRAS) V 3.1    Activities of Daily Living Subscale: (Maximum Score = 48)  (0=Normal 1=Slightly abnormal 2=Mildly 3=Moderately 4= Severely)  ADL Sub-Scale 2020   1. Speaking 0   2. Feeding with a spoon 2 "   3. Drinking from a glass 3   4. Hygiene 2   5. Dressing 1   6. Pouring 3   7. Carrying food trays, plates or similar items 3   8. Using keys 3   9. Writing 4   10. Working 3   11. Overall disability with the most affected task 4   Name of most affected task: Writing   12. Social impact 3   ADL TOTAL 31         Performance Subscale (Maximum score = 64)  (0=No tremor 1=Slight 2=Mild 3=Moderate 4= Severe)  Motor (Performance) Sub-Scale 8/5/2020   Assessment Time 9:07 AM   Medication None   DBS - Right Brain None   DBS - Left Brain None   Head 0   Face & Jaw 0   Voice 1   Outstretched - RIGHT 2   Outstretched - LEFT 0   Wingbeating - RIGHT 1.5   Wingbeating - LEFT 0   Kinetic - RIGHT 2   Kinetic - LEFT 2   Lower Limb - RIGHT 1   Lower Limb - LEFT 1   Lower Limb (Max) 1   Spiral - RIGHT 3   Axial 1     ASSESSMENT/PLAN:    Essential Tremor: Mr. Bang is a 74 year old *** handed male with a *** year history of tremors who came to the clinic for tremor evaluation as part of his Deep Brain Stimulation surgery work-up.    __  Pt had *** knowledge about DBS.  We discussed the highlights of DBS procedure, risks & benefits; the possibility of 1 - 3 % serious side effects including infection, bleeding, stroke, cognitive & speech impairment, seizures, . . . . & death; the possibility of lead misplacement; appropriate DBS candidates; and DBS programming & the need to return to clinic for reprogramming.  All questions were answered.    __ He was informed that we'll contact him after we discuss his work-up at the DBS Consensus Meeting.  He understands the plan.    The total time spent with the patient in tremor evaluation using a rating scale was 70 minutes and greater than 50% of the time was spent in counseling and coordination of care.    BENJIE Whaley, CNP  [facility]    8:47 AM        Again, thank you for allowing me to participate in the care of your patient.      Sincerely,    BENJIE Duenas  CNP

## 2020-08-13 ENCOUNTER — VIRTUAL VISIT (OUTPATIENT)
Dept: NEUROPSYCHOLOGY | Facility: CLINIC | Age: 75
End: 2020-08-13
Payer: MEDICARE

## 2020-08-13 DIAGNOSIS — F54 PSYCHOLOGICAL FACTORS AFFECTING MEDICAL CONDITION: ICD-10-CM

## 2020-08-13 DIAGNOSIS — G25.0 ESSENTIAL TREMOR: Primary | ICD-10-CM

## 2020-08-13 NOTE — PROGRESS NOTES
"Duane Faymoville is a 74 year old male who is being evaluated via a billable video visit.      The patient has been notified of following:     \"This video visit will be conducted via a call between you and your provider. This service lets us provide the care you need with a video conversation. In-person testing will be scheduled at the clinic at a later date.    Video visits are billed at different rates depending on your insurance coverage.  Please reach out to your insurance provider with any questions.    If during the course of the call the provider feels a video visit is not appropriate, you will not be charged for this service.\"    Patient has given verbal consent for Video visit? Yes  How would you like to obtain your AVS? MyChart  If you are dropped from the video visit, the video invite should be resent to: Send to e-mail at: [email or web address]  Will anyone else be joining your video visit? No    NEUROPSYCHOLOGICAL EVALUATION    RELEVANT HISTORY AND REASON FOR REFERRAL    Duane Faymoville is a 74 year old, right handed, retired  with 12 years of formal education. Information was obtained via video interview with the patient and his wife, and review of his medical records. Records indicate a history of essential tremor, which began 10 years ago as a postural action tremor of his right arm. This would increase with stress, fatigue, or late in the day. He has some voice tremor as well. Left hand tremor started a few years ago, but the right side has been more bothersome.  His tremor has increasingly affected his daily life, including writing, eating, drinking, and doing projects. He is interested in deep brain stimulation (DBS) surgery for management of his tremor. This psychological evaluation was undertaken at the request of Winston Dey M.D., as part of the presurgical protocol, to assess cognitive functioning and mood, as they pertain to his ability to make well reasoned medical " decisions, and to establish a neurocognitive baseline.     Due to precautions related to COVID-19, a video interview was conducted, with plans to complete the testing portion of the evaluation in the clinic in the near future.     CLINICAL INTERVIEW FINDINGS    Behavioral observations were limited, as the interview took place over a video platform. Tremors were not observed clinically over this limited video connection. Mood was euthymic. Speech was fluent, with a slight vocal tremor, and normal volume and rate. He presented his thoughts in a clear, logical manner. Memory and attention appeared to be adequate. Judgment and insight appeared to be good.    Upon interview, Mr. Bang and his wife indicated that his tremor became noticeable 10 years ago.  He has particular difficulty with fine detail work with his right hand, such as writing, using a screwdriver, or changing the battery on a remote.  His hands are not coordinated with his mind.  It takes two hands to use a pen.  He notices mild tremor on the left side at times, especially when he is using the left hand to help the right hand.  He is interested in addressing the right side of his body first, before making a decision about moving onto the left side.  He has a good understanding of the surgical procedure and feels confident about being awake during the surgery.  He listed bleeding and swelling as risks of the surgery.  He hopes that surgery might help his balance.  His wife is supportive of him proceeding, noting that she is looking forward to it.  His lack of ability to do things is frustrating for him.  She will be able to assist with his cares as necessary following the surgery.    Mr. Bang and his wife agreed that he has not had significant changes in cognition.  He tends to get very focused when he is doing things like reading, to the point that he tunes other things out, but this is not a change for him.  He has not had difficulty with  memory, word finding, or decision-making.  He manages his own medications and driving, apparently without difficulty.  He handles his personal cares independently.    Mr. Bang denied any history of psychiatric illness.  He has not worked with a psychiatrist or a psychologist.  He described his mood as fine, and he has not had mood swings.  He noted that there are a number of things going on this year which are not normal but he feels that he is managing well.  He is looking forward to opening their [place] wider.  He has a large extended family and stays in frequent contact with them particularly over the phone.  He has not had feelings of depression or sadness, guilt, or anxiety.  He sleeps 4-1/2 to 5 hours a night and then tosses and turns until daylight, although this has been his pattern for many years.  He does not act out his dreams.  If he has any type of activity he does not nap, but if he sits down and reads he is likely to doze off.  He is quite active.  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  His interest level and motivation are good.  He denied visual or auditory hallucinations.  He denied suicidal ideation or any history of attempts to commit suicide.    Mr. Bang drinks alcohol rarely, perhaps 1 drink once a month.  It does not seem to have an effect on his tremor.  He denied illicit drug use or tobacco use.    Mr. Bang completed high school and had some vocational training.  He worked as a  for over 40 years, retiring in 2010.  He has been  for 50 years and they have 2 sons and no grandchildren.  As noted, they have a large extended family with whom they are quite close.    Mr. Bang denied any history of seizure, stroke, or head injury resulting in loss of consciousness.  His balance  has been poor.  He leans against things for support.  He rarely experiences headaches.  He has not had unilateral weakness or numbness.  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    PAST MEDICAL HISTORY: Medical records indicate a history of tremor.    CURRENT MEDICATIONS:  Include atorvastatin, enalapril, and metformin.    FAMILY MEDICAL HISTORY:  Significant for a mother who  in 2018 with dementia, and many family members who  in their 50s of heart disease, who used alcohol excessively and otherwise did not care for their health over time.    CONCLUSIONS    Duane Faymoville is a 74 year old man with a 10 year history of essential tremor. He is interested in undergoing DBS surgery for management of his tremor. He completed this video interview as part of the neuropsychological evaluation, a standard component of the pre-surgical protocol.  Precautions are in place due to COVID-19, and the testing will be scheduled separately at the clinic in the near future, in order to complete this neuropsychological evaluation.    On interview, Mr. Bang and his wife indicated that they have not noticed any significant changes in his cognitive functioning. He does not have a significant psychiatric history, and on interview, is not reporting symptoms of depression, anxiety, or apathy. He drinks alcohol rarely, perhaps one drink a month, and it does not seem to have an effect on his tremor. His wife is supportive of him pursuing surgery, and will be able to assist with his cares as necessary following surgery. He has a good understanding of the surgical procedure and the risks involved.    As noted, Mr. Bang will be seen at the [facility] to complete the testing portion of the neuropsychological evaluation. At that time, final recommendations regarding surgical candidacy will be made.  He will be contacted to schedule this appointment as soon as possible.    Chiara Haque,  Ph.D., ABPP  [employer] [id number]  Board Certified in Clinical Neuropsychology    Time spent:  One hour professional time, including interview, biopsychosocial assessment, and report writing (CPT 08933) ICD-10 diagnosis: G25; F54.    Video-Visit Details    Type of service:  Video Visit    Video Start Time: 10:02 a.m.  Video End Time: 10:42 a.m.    Originating Location (pt. Location): Home    Distant Location (provider location):  [facility] NEUROPSYCHOLOGY     Platform used for Video Visit: Yahir Haque, PhD

## 2020-08-19 ENCOUNTER — TELEPHONE (OUTPATIENT)
Dept: NEUROLOGY | Facility: CLINIC | Age: 75
End: 2020-08-19

## 2020-08-19 NOTE — TELEPHONE ENCOUNTER
Deep Brain Stimulation Surgery Surgical Candidacy Form    Referring Provider: Self   Patient Information: Lives in [place], [place]  Name: Duane Faymoville  YOB: 1945  Age: 74 year old  Vital Signs:  Blood pressure (!) 146/73, pulse 60, weight 89.3 kg (196 lb 12.8 oz), SpO2 97 %.  Diagnosis: Essential tremor  Age of Onset of Symptoms: 64 y.o. Year of Onset of Symptoms: 2010  Age of Diagnosis: 69. Year of Diagnosis:2015.  Handedness: Right.  Side of Onset: bilateral     Disease Features: Tremor     Surgery Goals:When tremors gets worse, being able to write a check, pay bills.  Tremors are worse in the evening. Eating meals with friend and family is fine, but if there are strangers or at restaurants, the tremor gets worse.      Interested in both sides treated but would be more happy if his right was tremor free.OK with rechargeable.     Medications: As of 8/19/20  Current Outpatient Medications   Medication Sig Dispense Refill     atorvastatin (LIPITOR) 10 MG tablet Take 10 mg by mouth daily       enalapril (VASOTEC) 2.5 MG tablet Take 2.5 mg by mouth daily       metFORMIN (GLUCOPHAGE) 500 MG tablet Take 500 mg by mouth daily (with breakfast)     '  No medications for tremor control.        Neuropsychological Evaluation:    Cognitive Issues: Results indicate performance impairments in retrieval on one list learning task, with memory otherwise falling well within normal limits. Performance otherwise falls in the average to high average range across cognitive domains.    Psychiatric Issues: He has no significant psychiatric history, and does not report significant depressive symptomatology, anxiety, or apathy. He rarely drinks alcohol, and does not think it affects his tremor. He has a good support system in his family.    Depression: No depression.    Cognitive Function: No signs or symptoms of cognitive dysfunction.    Psychosis: No  "hallucinations.     MRI Date: 08/05/2020    Impression:No acute intracranial pathology. Moderate leukoaraiosis.      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 He has never used smokeless tobacco. He reports current alcohol use.    Family Hx of Movement Disorders: only one of his sons have tremor in the hands that began at age 30.      Consult Dr. Dey/Linda     Patient discussed at conference on: 8/27/20     DBS Meeting Notes/Further Work-up Needed: -  1. Candidate LVIM   2. Wait and see  3. [employer]  4. Research  ---------------  Patient is \"on board\" and interested in hearing about research opportunities.      "

## 2020-08-24 ENCOUNTER — OFFICE VISIT (OUTPATIENT)
Dept: NEUROPSYCHOLOGY | Facility: CLINIC | Age: 75
End: 2020-08-24
Payer: MEDICARE

## 2020-08-24 DIAGNOSIS — G25.0 ESSENTIAL TREMOR: Primary | ICD-10-CM

## 2020-08-24 DIAGNOSIS — F09 MENTAL DISORDER DUE TO GENERAL MEDICAL CONDITION: ICD-10-CM

## 2020-08-24 NOTE — LETTER
8/24/2020      RE: Duane Faymoville  [address] [facility] [address] [place] [address]       NEUROPSYCHOLOGICAL EVALUATION    RELEVANT HISTORY AND REASON FOR REFERRAL    Duane Faymoville is a 74 year old, right handed, retired  with 12 years of formal education.  Records indicate a history of essential tremor, which began 10 years ago as a postural action tremor of his right arm. This would increase with stress, fatigue, or late in the day. He has some voice tremor as well. Left hand tremor started a few years ago, but the right side has been more bothersome.  His tremor has increasingly affected his daily life, including writing, eating, drinking, and doing projects. He is interested in deep brain stimulation (DBS) surgery for management of his tremor. This psychological evaluation was undertaken at the request of Winston Dey M.D., as part of the presurgical protocol, to assess cognitive functioning and mood, as they pertain to his ability to make well reasoned medical decisions, and to establish a neurocognitive baseline.     Mr. Bang completed the interview portion of this evaluation on 8/13/2020, over a video platform, due to precautions related to COVID-19. He came to the clinic today to complete the testing portion of the evaluation.      Please refer to the report dated 8/13//2020 for full details regarding his history. Briefly, he and his wife indicated that they have not noticed any significant changes in his cognitive functioning. He does not have a significant psychiatric history, and on interview, did not report symptoms of depression, anxiety, or apathy. He drinks alcohol rarely, perhaps one drink a month, and it does not seem to have an effect on his tremor. His wife is supportive of him pursuing surgery, and will be able to assist with his cares as necessary following surgery. He has a good understanding of the surgical procedure and the risks  involved.    PAST MEDICAL HISTORY: Medical records indicate a history of tremor.     CURRENT MEDICATIONS:  Include atorvastatin, enalapril, and metformin.     FAMILY MEDICAL HISTORY:  Significant for a mother who  in 2018 with dementia, and many family members who  in their 50s of heart disease, who used alcohol excessively and otherwise did not care for their health over time.    BEHAVIORAL OBSERVATIONS    During the evaluation, Mr. Bang was pleasant, cooperative, and seemed to understand the instructions. He was alert and oriented to person, place, and time. Tremors were observed clinically, particularly in his right arm. Gait was slow. Mood was euthymic. Speech was fluent, with a slight vocal tremor, and normal volume and rate. Spontaneous conversation was present and appropriate. Internal performance validity measures fell within normal limits. The results are believed to accurately reflect his current level of functioning.     MEASURES ADMINISTERED    The following measures were administered by a trained psychometrist, under the direct supervision of a licensed psychologist.    Subtests of the Wechsler Adult Intelligence Scale-4; Reading subtest of the Wide Range Achievement Test-4; subtests of the Wechsler Memory Scale-Revised; Flowers Verbal Learning Test-Revised, Form 4; Brief Visual Memory Test - Revised, Form 3; [place] Naming Test; D-KEFS Verbal Fluency, Standard Form; Trail Making Test; Stroop; [place] Card Sorting Test - 64; [place] Judgement of Line Orientation Form H; Clock Drawing; Dementia Rating Scale - 2 (DRS-2) Alternate Form; MoCA v. 7.1;  Cornell Depression Inventory-2 (BDI-2), HAM-D, HAM-A, Apathy Scale, RBDSQ; QUEST, ESS.     RESULTS AND INTERPRETATION    Overall intellectual functioning was estimated to fall in the average range, consistent with premorbid estimates based on single word reading abilities.  On a screening measure of dementia, performance fell in the high average  range (DRS-2 Total Score = 141/144).  On the MoCA, performance fell within normal limits (25/30), with errors in memory.    Confrontation naming was above average for his age.  Verbal abstract reasoning was average.  Ability to comprehend and articulate responses to complex social situations was average.  Letter fluency and switching fluency were average.  Generative naming to category was high average.    Attention span was average for his age.  Divided attention was average on a timed, visuomotor sequencing task, and was mildly impaired on an untimed, auditory sequencing task..  Performance on a measure of distractibility was low average.  Psychomotor processing speed was low average.    Basic visual perception, including matching lines and angles, was above average for his age.  Construction of a clock was notable for a slight tremor, but fell within normal limits.  Nonverbal deductive reasoning was average.    Novel problem-solving, including the ability to generate strategies and solutions, fell within normal limits for his age and level of education.    Immediate and 30-minute delayed recall of verbal narrative material was average.  On a multiple trial list learning task, immediate recall was low average.  Recall following a 25-minute delay was severely impaired, but recognition memory on this task was above average, with no errors.  Immediate recall of visual material was average, with average recall following a 25-minute delay.    On the BDI-2, self-report questionnaire, he did not endorse significant depressive symptomatology.  He did not endorse significant apathy on a scale.    IMPRESSIONS AND RECOMMENDATIONS    Results indicate impairments and retrieval on one list learning task, with memory otherwise falling well within normal limits.  There was very subtle variability in attention, which may have contributed to the findings on the list learning task.  Performance otherwise fell within normal limits  across cognitive domains, in the average to high average range.  He did not endorse significant depressive symptomatology, anxiety, or apathy.    This pattern of performance does not reflect dementia at this time, nor is it suggestive of focal or lateralized cerebral involvement.  He appears to be capable of comprehending medical information and making well reasoned decisions for himself.  He has a good understanding of the surgical procedure and the risks involved.  He does not appear to be experiencing emotional problems that might interfere with his judgment or ability to follow through with treatment recommendations. He rarely drinks alcohol and does not think that it affects his tremor.  He has a good support system in his family.  He appears to be a good candidate for surgery from a neurocognitive perspective.    In terms of daily functioning, Mr. Bang's cognitive inefficiencies are not likely to interfere with his ability to actively participate in treatment or to manage his instrumental activities of daily living independently. Given subtle variability in attention, he may find it helpful when working on a task, to work in an environment that is relatively free from distractions, such as noises or other interruptions. He may find it helpful to complete one task before beginning another, or to write himself a brief note reminding himself of what he has finished and what he has yet to complete. He may benefit from the use of written reminders or checklists, or he could use a smart phone for this purpose.    Results may serve as a baseline of his neurocognitive functioning.  Repeated neuropsychological evaluation in 1 year may help to determine whether his cognitive inefficiencies progress, remit, or remain stable.       Chiara Haque, Ph.D., [facility]  Licensed Psychologist, LP [id number]  Board Certified in Clinical Neuropsychology    Time spent: 60 minutes (1 unit) neuropsychological testing evaluation by  licensed and board-certified neuropsychologist, including integration of patient data, interpretation of standardized test results and clinical data, clinical decision-making, treatment planning, report, and interactive feedback to the patient, first hour (CPT 74254). 100 minutes (2 units) of neuropsychological testing evaluation by licensed and board-certified neuropsychologist, including integration of patient data, interpretation of standardized test results and clinical data, clinical decision-making, treatment planning, report, and interactive feedback to the patient, subsequent hours (CPT 50099). 30 minutes of neuropsychological test administration and scoring by technician, first 30 minutes (CPT 20523). 185 additional minutes (6 units) neuropsychological test administration and scoring by technician, subsequent 30 minutes (CPT 53489). ICD-10 Diagnoses: G25; F06.8      Chiara Haque, PhD LP

## 2020-08-24 NOTE — NURSING NOTE
Pt was seen for neuropsychological evaluation at the request of Dr. Winston Dey for the purposes of diagnostic clarification and treatment planning. 215 minutes of test administration and scoring were provided by this writer. Please see Dr. Chiara Haque's report for a full interpretation of the findings.    Weston Arreaga  Psychometrist

## 2020-08-31 ENCOUNTER — TELEPHONE (OUTPATIENT)
Dept: NEUROLOGY | Facility: CLINIC | Age: 75
End: 2020-08-31

## 2020-08-31 NOTE — TELEPHONE ENCOUNTER
"From the 20 DEEP BRAIN STIMULATION Consensus meetin. Candidate LVIM   2. Wait and see  3. [employer] 5  4. Research  ---------------  Patient is \"on board\" and interested in hearing about research opportunities.  "

## 2020-09-03 NOTE — PROGRESS NOTES
NEUROPSYCHOLOGICAL EVALUATION    RELEVANT HISTORY AND REASON FOR REFERRAL    Duane Faymoville is a 74 year old, right handed, retired  with 12 years of formal education.  Records indicate a history of essential tremor, which began 10 years ago as a postural action tremor of his right arm. This would increase with stress, fatigue, or late in the day. He has some voice tremor as well. Left hand tremor started a few years ago, but the right side has been more bothersome.  His tremor has increasingly affected his daily life, including writing, eating, drinking, and doing projects. He is interested in deep brain stimulation (DBS) surgery for management of his tremor. This psychological evaluation was undertaken at the request of Winston Dey M.D., as part of the presurgical protocol, to assess cognitive functioning and mood, as they pertain to his ability to make well reasoned medical decisions, and to establish a neurocognitive baseline.     Mr. Bang completed the interview portion of this evaluation on 8/13/2020, over a video platform, due to precautions related to COVID-19. He came to the clinic today to complete the testing portion of the evaluation.      Please refer to the report dated 8/13//2020 for full details regarding his history. Briefly, he and his wife indicated that they have not noticed any significant changes in his cognitive functioning. He does not have a significant psychiatric history, and on interview, did not report symptoms of depression, anxiety, or apathy. He drinks alcohol rarely, perhaps one drink a month, and it does not seem to have an effect on his tremor. His wife is supportive of him pursuing surgery, and will be able to assist with his cares as necessary following surgery. He has a good understanding of the surgical procedure and the risks involved.    PAST MEDICAL HISTORY: Medical records indicate a history of tremor.     CURRENT MEDICATIONS:  Include  atorvastatin, enalapril, and metformin.     FAMILY MEDICAL HISTORY:  Significant for a mother who  in 2018 with dementia, and many family members who  in their 50s of heart disease, who used alcohol excessively and otherwise did not care for their health over time.    BEHAVIORAL OBSERVATIONS    During the evaluation, Mr. Bang was pleasant, cooperative, and seemed to understand the instructions. He was alert and oriented to person, place, and time. Tremors were observed clinically, particularly in his right arm. Gait was slow. Mood was euthymic. Speech was fluent, with a slight vocal tremor, and normal volume and rate. Spontaneous conversation was present and appropriate. Internal performance validity measures fell within normal limits. The results are believed to accurately reflect his current level of functioning.     MEASURES ADMINISTERED    The following measures were administered by a trained psychometrist, under the direct supervision of a licensed psychologist.    Subtests of the Wechsler Adult Intelligence Scale-4; Reading subtest of the Wide Range Achievement Test-4; subtests of the Wechsler Memory Scale-Revised; Flowers Verbal Learning Test-Revised, Form 4; Brief Visual Memory Test - Revised, Form 3; [place] Naming Test; D-KEFS Verbal Fluency, Standard Form; Trail Making Test; Stroop; [place] Card Sorting Test - 64; [place] Judgement of Line Orientation Form H; Clock Drawing; Dementia Rating Scale - 2 (DRS-2) Alternate Form; MoCA v. 7.1;  Cornell Depression Inventory-2 (BDI-2), HAM-D, HAM-A, Apathy Scale, RBDSQ; QUEST, ESS.     RESULTS AND INTERPRETATION    Overall intellectual functioning was estimated to fall in the average range, consistent with premorbid estimates based on single word reading abilities.  On a screening measure of dementia, performance fell in the high average range (DRS-2 Total Score = 141/144).  On the MoCA, performance fell within normal limits (25/30), with errors in  memory.    Confrontation naming was above average for his age.  Verbal abstract reasoning was average.  Ability to comprehend and articulate responses to complex social situations was average.  Letter fluency and switching fluency were average.  Generative naming to category was high average.    Attention span was average for his age.  Divided attention was average on a timed, visuomotor sequencing task, and was mildly impaired on an untimed, auditory sequencing task..  Performance on a measure of distractibility was low average.  Psychomotor processing speed was low average.    Basic visual perception, including matching lines and angles, was above average for his age.  Construction of a clock was notable for a slight tremor, but fell within normal limits.  Nonverbal deductive reasoning was average.    Novel problem-solving, including the ability to generate strategies and solutions, fell within normal limits for his age and level of education.    Immediate and 30-minute delayed recall of verbal narrative material was average.  On a multiple trial list learning task, immediate recall was low average.  Recall following a 25-minute delay was severely impaired, but recognition memory on this task was above average, with no errors.  Immediate recall of visual material was average, with average recall following a 25-minute delay.    On the BDI-2, self-report questionnaire, he did not endorse significant depressive symptomatology.  He did not endorse significant apathy on a scale.    IMPRESSIONS AND RECOMMENDATIONS    Results indicate impairments and retrieval on one list learning task, with memory otherwise falling well within normal limits.  There was very subtle variability in attention, which may have contributed to the findings on the list learning task.  Performance otherwise fell within normal limits across cognitive domains, in the average to high average range.  He did not endorse significant depressive  symptomatology, anxiety, or apathy.    This pattern of performance does not reflect dementia at this time, nor is it suggestive of focal or lateralized cerebral involvement.  He appears to be capable of comprehending medical information and making well reasoned decisions for himself.  He has a good understanding of the surgical procedure and the risks involved.  He does not appear to be experiencing emotional problems that might interfere with his judgment or ability to follow through with treatment recommendations. He rarely drinks alcohol and does not think that it affects his tremor.  He has a good support system in his family.  He appears to be a good candidate for surgery from a neurocognitive perspective.    In terms of daily functioning, Mr. Bang's cognitive inefficiencies are not likely to interfere with his ability to actively participate in treatment or to manage his instrumental activities of daily living independently. Given subtle variability in attention, he may find it helpful when working on a task, to work in an environment that is relatively free from distractions, such as noises or other interruptions. He may find it helpful to complete one task before beginning another, or to write himself a brief note reminding himself of what he has finished and what he has yet to complete. He may benefit from the use of written reminders or checklists, or he could use a smart phone for this purpose.    Results may serve as a baseline of his neurocognitive functioning.  Repeated neuropsychological evaluation in 1 year may help to determine whether his cognitive inefficiencies progress, remit, or remain stable.       Chiara Haque, Ph.D., ABPP  Licensed Psychologist, LP [id number]  Board Certified in Clinical Neuropsychology    Time spent: 60 minutes (1 unit) neuropsychological testing evaluation by licensed and board-certified neuropsychologist, including integration of patient data, interpretation of  standardized test results and clinical data, clinical decision-making, treatment planning, report, and interactive feedback to the patient, first hour (CPT 00230). 100 minutes (2 units) of neuropsychological testing evaluation by licensed and board-certified neuropsychologist, including integration of patient data, interpretation of standardized test results and clinical data, clinical decision-making, treatment planning, report, and interactive feedback to the patient, subsequent hours (CPT 68168). 30 minutes of neuropsychological test administration and scoring by technician, first 30 minutes (CPT 13439). 185 additional minutes (6 units) neuropsychological test administration and scoring by technician, subsequent 30 minutes (CPT 17569). ICD-10 Diagnoses: G25; F06.8

## 2020-09-04 NOTE — PROGRESS NOTES
Name: Faymoville, Duane MRN: [id number]  : 1945  RIVERA: 2020  Staff: STEPHANIE Tech: SH Age: 74  Sex: Male Hand: Right   Educ: 12  Occupation: Retired   Hearing:  ?with correction / ?without correction    WAIS-IV     Raw SSa    Similarities  22 9     Comprehension  24 10     Letter Num. Seq. 13 6     Digit Span  23 9     Matrix Reasoning 13 11    WRAT4   SS %ile Grade Equiv.     Word Reading  92 30 10.8    WMS-Revised  Raw    MAS     Info & Orientation 14       LM I   22 10     LM II   19 10      SHARMA VERBAL LEARNING TEST - REVISED  Form   4  Raw T     Trial 1   4     Trial 2   8     Trial 3   8     Total 1-3  20 42     Learning  4     Delayed Recall  0 <20     Percent Retention 0 <20     True Positives  12     Discrimination Index 12 59     False Positives  0    BRIEF VISUAL MEMORY TEST - REVISED  Form   3  Raw                   T     Trial 1   4     Trial 2   6     Trial 3   8     Total 1-3  18 45     Learning  4 51     Delay   7 46     Percent Retention 88 >16th%ile     True Positives  6     Discrimination Index 6 >16th%ile     False Positives  0    [place] NAMING TEST   Score: 58 MAS: 14  91%ile                    [ 58   w/o cues        1   w/phonemic cues]     D-KEFS VERBAL FLUENCY Standard        Raw   Age Scaled     Letter Fluency    30              9     Category Fluency   38              12       Switching Fluency              Total Correct   12              10              Switching Accuracy  11          10    CLOCK DRAWING     Command   3/3             Copy     3/3    TRAIL MAKING TEST    Raw         Err  MAS  %ile   A 48            0               8             25   B 85        0               10             50    STROOP   Raw + Bucky  =   Total          MAS %ile    Word 62 +     --  = 62 6 9   Color  57 +     --= 57 9 37       Color/Word  25 +    --= 25 8 25       WCST (64 cards)    Raw   T/%ile   Categories: 5 %ile   #Persev. Err.: raw T   Concept. Resp.: raw T   FTMS:  0    [place] JUDGMENT  OF LINE ORIENTATION Form H   Raw: 27 MAS: 14  86%ile:    [place]MENT[place] RATING SCALE - 2 Alternate       Raw       MAS            Raw      MAS  Attention  36  11        Concept   37           10  Init/Psv  37  12  Memory   25       13  Construct 6  10   Total     141/144     12     BDI-II:  6 minimal      APATHY SCALE:        6   ESS  RBDSQ  QUEST    MAS = [place] Older Adult Normative Study Age Adj. Scaled Score

## 2020-09-08 ENCOUNTER — TELEPHONE (OUTPATIENT)
Dept: NEUROSURGERY | Facility: CLINIC | Age: 75
End: 2020-09-08

## 2020-09-08 DIAGNOSIS — R25.1 TREMOR: Primary | ICD-10-CM

## 2020-09-08 DIAGNOSIS — Z11.59 ENCOUNTER FOR SCREENING FOR OTHER VIRAL DISEASES: Primary | ICD-10-CM

## 2020-09-08 NOTE — TELEPHONE ENCOUNTER
Patient is scheduled for surgery with Dr. Mckeon      Spoke or left message with: Patient    Date of Surgery: 10/06 and 10/13/20    Location [facility] OR    H&P: Scheduled with PAC 09/28/20    Post op: 10/26/20    Additional imaging/appointments: COVID test     Surgery packet sent: the nurse will mail out     Additional comments: The patient is aware they need a PRE-OP/PAC appt at least a couple of weeks before surgery date. We went over the patient needing a  and someone to stay with them for 24 hours after the surgery. The patient was given my direct number for any questions [phone]

## 2020-09-09 NOTE — TELEPHONE ENCOUNTER
FUTURE VISIT INFORMATION      SURGERY INFORMATION:    Date: 10/6/20    Location[facility] or    Surgeon:  Darin Mckeon MD     Anesthesia Type:  Combined MAC with Local     Procedure: stealth assisted Left side deep brain stimulator placement, phase I, placement of left side deep brain stimulator electrode, target left ventral intermediate nucleus of the thalamus, with microelectrode recording     Consult: ov 8/3    RECORDS REQUESTED FROM:       Primary Care Provider: Winston Dey MD- [facility]

## 2020-09-25 PROBLEM — N52.9 PRIMARY ERECTILE DYSFUNCTION: Status: ACTIVE | Noted: 2020-09-25

## 2020-09-25 PROBLEM — D69.6 THROMBOCYTOPENIC DISORDER (H): Status: ACTIVE | Noted: 2020-09-25

## 2020-09-25 PROBLEM — K59.09 CHRONIC CONSTIPATION: Status: ACTIVE | Noted: 2020-09-25

## 2020-09-25 PROBLEM — E78.5 HYPERLIPIDEMIA: Status: ACTIVE | Noted: 2020-09-25

## 2020-09-25 PROBLEM — E11.9 TYPE 2 DIABETES MELLITUS (H): Status: ACTIVE | Noted: 2020-09-25

## 2020-09-25 PROBLEM — H26.9 CATARACT: Status: ACTIVE | Noted: 2020-09-25

## 2020-09-25 PROBLEM — M75.41 IMPINGEMENT SYNDROME OF RIGHT SHOULDER REGION: Status: ACTIVE | Noted: 2020-09-25

## 2020-09-25 PROBLEM — N40.0 BENIGN PROSTATIC HYPERPLASIA: Status: ACTIVE | Noted: 2020-09-25

## 2020-09-25 PROBLEM — K27.9 PEPTIC ULCER: Status: ACTIVE | Noted: 2020-09-25

## 2020-09-25 PROBLEM — Z86.0100 HISTORY OF COLONIC POLYPS: Status: ACTIVE | Noted: 2020-09-25

## 2020-09-25 PROBLEM — I83.90 VARICOSE VEINS OF LOWER EXTREMITY: Status: ACTIVE | Noted: 2020-09-25

## 2020-09-28 ENCOUNTER — OFFICE VISIT (OUTPATIENT)
Dept: SURGERY | Facility: CLINIC | Age: 75
End: 2020-09-28
Payer: MEDICARE

## 2020-09-28 ENCOUNTER — PRE VISIT (OUTPATIENT)
Dept: SURGERY | Facility: CLINIC | Age: 75
End: 2020-09-28

## 2020-09-28 ENCOUNTER — ANESTHESIA EVENT (OUTPATIENT)
Dept: SURGERY | Facility: CLINIC | Age: 75
DRG: 027 | End: 2020-09-28
Payer: MEDICARE

## 2020-09-28 VITALS
HEIGHT: 68 IN | TEMPERATURE: 98.2 F | DIASTOLIC BLOOD PRESSURE: 72 MMHG | RESPIRATION RATE: 14 BRPM | WEIGHT: 197 LBS | HEART RATE: 60 BPM | BODY MASS INDEX: 29.86 KG/M2 | SYSTOLIC BLOOD PRESSURE: 139 MMHG | OXYGEN SATURATION: 96 %

## 2020-09-28 DIAGNOSIS — Z01.818 PREOP EXAMINATION: Primary | ICD-10-CM

## 2020-09-28 DIAGNOSIS — Z01.818 PREOP EXAMINATION: ICD-10-CM

## 2020-09-28 LAB
ANION GAP SERPL CALCULATED.3IONS-SCNC: 6 MMOL/L (ref 3–14)
BUN SERPL-MCNC: 23 MG/DL (ref 7–30)
CALCIUM SERPL-MCNC: 9.1 MG/DL (ref 8.5–10.1)
CHLORIDE SERPL-SCNC: 106 MMOL/L (ref 94–109)
CO2 SERPL-SCNC: 26 MMOL/L (ref 20–32)
CREAT SERPL-MCNC: 0.74 MG/DL (ref 0.66–1.25)
ERYTHROCYTE [DISTWIDTH] IN BLOOD BY AUTOMATED COUNT: 12.1 % (ref 10–15)
GFR SERPL CREATININE-BSD FRML MDRD: >90 ML/MIN/{1.73_M2}
GLUCOSE SERPL-MCNC: 204 MG/DL (ref 70–99)
HBA1C MFR BLD: 6.4 % (ref 0–5.6)
HCT VFR BLD AUTO: 46.4 % (ref 40–53)
HGB BLD-MCNC: 15.3 G/DL (ref 13.3–17.7)
INR PPP: 0.99 (ref 0.86–1.14)
MCH RBC QN AUTO: 30.7 PG (ref 26.5–33)
MCHC RBC AUTO-ENTMCNC: 33 G/DL (ref 31.5–36.5)
MCV RBC AUTO: 93 FL (ref 78–100)
NT-PROBNP SERPL-MCNC: 38 PG/ML (ref 0–125)
PLATELET # BLD AUTO: 238 10E9/L (ref 150–450)
POTASSIUM SERPL-SCNC: 4 MMOL/L (ref 3.4–5.3)
RBC # BLD AUTO: 4.99 10E12/L (ref 4.4–5.9)
SODIUM SERPL-SCNC: 138 MMOL/L (ref 133–144)
WBC # BLD AUTO: 5 10E9/L (ref 4–11)

## 2020-09-28 ASSESSMENT — LIFESTYLE VARIABLES: TOBACCO_USE: 0

## 2020-09-28 ASSESSMENT — MIFFLIN-ST. JEOR: SCORE: 1618.09

## 2020-09-28 ASSESSMENT — PAIN SCALES - GENERAL: PAINLEVEL: MILD PAIN (2)

## 2020-09-28 NOTE — ANESTHESIA PREPROCEDURE EVALUATION
"Anesthesia Pre-Procedure Evaluation    Patient: Duane Faymoville   MRN:     [id number] Gender:   male   Age:    74 year old :      1945        Preoperative Diagnosis: * No surgery found *        LABS:  CBC: No results found for: WBC, HGB, HCT, PLT  BMP: No results found for: NA, POTASSIUM, CHLORIDE, CO2, BUN, CR, GLC  COAGS: No results found for: PTT, INR, FIBR  POC: No results found for: BGM, HCG, HCGS  OTHER: No results found for: PH, LACT, A1C, KRISTIN, PHOS, MAG, ALBUMIN, PROTTOTAL, ALT, AST, GGT, ALKPHOS, BILITOTAL, BILIDIRECT, LIPASE, AMYLASE, JONA, TSH, T4, T3, CRP, SED     Preop Vitals    BP Readings from Last 3 Encounters:   20 139/72   20 (!) 146/73   20 (!) 157/74    Pulse Readings from Last 3 Encounters:   20 60   20 60   20 65      Resp Readings from Last 3 Encounters:   20 14   20 16    SpO2 Readings from Last 3 Encounters:   20 96%   20 97%   20 97%      Temp Readings from Last 1 Encounters:   20 98.2  F (36.8  C) (Oral)    Ht Readings from Last 1 Encounters:   20 1.727 m (5' 8\")      Wt Readings from Last 1 Encounters:   20 89.4 kg (197 lb)    Estimated body mass index is 29.95 kg/m  as calculated from the following:    Height as of this encounter: 1.727 m (5' 8\").    Weight as of this encounter: 89.4 kg (197 lb).     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 Pt has had prior anesthetic.     No history of anesthetic complications          ROS/MED HX    ENT/Pulmonary:  - neg pulmonary ROS    (-) tobacco use   Neurologic:     (+)other neuro tremor    Cardiovascular:     (+) Dyslipidemia, hypertension----. : . . . :. . No previous cardiac testing       METS/Exercise Tolerance: Comment: Uses stationary bike, walks, light weight training  Exercises daily >4 METS   Hematologic:  - neg hematologic  ROS      (-) history of blood clots and History of Transfusion   Musculoskeletal: Comment: Chronic low back pain        GI/Hepatic:  - neg GI/hepatic ROS       Renal/Genitourinary:  - ROS Renal section negative       Endo:     (+) type II DM .   (-) thyroid disease   Psychiatric:         Infectious Disease:   (+) Other Infectious Disease h/o Lyme disease      Malignancy:      - no malignancy   Other:    (+) no H/O Chronic Pain,                       PHYSICAL EXAM:   Mental Status/Neuro: A/A/O; Age Appropriate   Airway: Facies: Feasible  Mallampati: I  Mouth/Opening: Full  TM distance: > 6 cm  Neck ROM: Full   Respiratory: Auscultation: CTAB     Resp. Rate: Normal     Resp. Effort: Normal      CV: Rhythm: Regular  Rate: Age appropriate  Heart: Normal Sounds  Edema: None   Comments:      Dental: Normal Dentition                Assessment:   ASA SCORE: 2      Smoking Status:  Non-Smoker/Unknown   NPO Status: NPO Appropriate     Plan:   Anes. Type:  MAC   Pre-Medication: None   Induction:  IV (Standard)      Access/Monitoring: PIV        Postop Plan:   Postop Pain: Opioids  Postop Sedation/Airway: Not planned     PONV Management:   Adult Risk Factors:, Non-Smoker, Postop Opioids   Prevention: Ondansetron     CONSENT: Direct conversation   Plan  and risks discussed with: Patient   Blood Products: Consent Deferred (Minimal Blood Loss)                PAC Discussion and Assessment    ASA Classification: 2  Case is suitable for: [place]  Anesthetic techniques and relevant risks discussed: MAC with GA as backup  Invasive monitoring and risk discussed: No  Types:   Possibility and Risk of blood transfusion discussed: No  NPO instructions given:   Additional anesthetic preparation and risks discussed:   Needs early admission to pre-op area:   Other:     PAC Resident/NP Anesthesia Assessment:  Duane Faymoville is a 74 year old male scheduled for DBS placement, left on 10/6 under MAC with local and DBS generator placement left chest wall under general anesthesia 10/13 by Dr. Mckeon in treatment of tremor.  PAC referral for risk assessment and optimization for anesthesia with comorbid conditions of hypertension, dyslipidemia, and NIDDM:    Pre-operative considerations:  1.  Cardiac:  Functional status- METS >4.  Pt is very active. He exercises daily with walking/stationary biking.  He pushes the .   Intermediate risk surgery with 0.4% (RCRI #) risk of major adverse cardiac event.   Hypertension, will hold enalapril for phase 2, general anesthesia.  Lipitor at bedtime.  2.  Pulm:  Airway feasible.  YONNY risk: Intermediate.  Never smoker.  3.  GI:  Risk of PONV score =  1.  If > 2, anti-emetic intervention recommended.  4.  Neuro: h/o tremor, right arm.  Left hand now as well.  No parkinsonism, no resting tremor.    VTE risk: 3%  (age, gender, FH of VTE)    Patient is optimized and is acceptable candidate for the proposed procedure.  No further diagnostic evaluation is needed.         **For further details of assessment, testing, and physical exam please see H and P completed on same date.          Helene Grullon PA-C, [facility]      Reviewed and Signed by PAC Mid-Level Provider/Resident  Mid-Level Provider/Resident: Helene Grullon  Date: 9/28/2020  Time:      Attending Anesthesiologist Anesthesia Assessment:        Anesthesiologist:   Date:   Time:   Pass/Fail:   Disposition:     PAC Pharmacist Assessment:        Pharmacist:   Date:   Time:    Helene Grullon PA-C

## 2020-09-28 NOTE — H&P
Pre-Operative H & P     CC:  Preoperative exam to assess for increased cardiopulmonary risk while undergoing surgery and anesthesia.    Date of Encounter: 9/28/2020  Primary Care Physician:  Winston Dey  Associated Diagnosis: tremor    HPI  Duane Faymoville is a 74 year old male who presents for pre-operative H & P in preparation for DBS placement, left on 10/6 under MAC with local and DBS generator placement left chest wall under general anesthesia 10/13 with Dr. Mckeon at [facility].     This is a 74 year old male with PMH significant for hypertension, dyslipidemia, and DM.  He began having postural tremors of the right arm about 10 years ago.  The tremors get worse with stress/anxiety.  He denies tremor anywhere else in his body.  He reports that he know has tremor in the left hand as well.  His tremor significantly affects his everyday life. He has difficulty writing and holding objects. He has difficulty using a screwdriver and doing things around the house that he would like to do.  He has been evaluated by neurology and has no Parkinsonism.  He is now scheduled for the above procedure and hopes for a better quality of life.      History is obtained from the patient and medical record.    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, antepartum        Past Surgical History  Past Surgical History:   Procedure Laterality Date     COLONOSCOPY  06/01/2015     HERNIA REPAIR, UMBILICAL  10/01/2012     ORTHOPEDIC SURGERY Left     ORIF left forearm after fracture     PROSTATE SURGERY   03/01/2015     VASCULAR SURGERY Left 04/01/2012    left leg varicose vein surgery     VASCULAR SURGERY Right 03/01/2012    right leg varicose vein surgery       Hx of Blood transfusions/reactions: denies     Hx of abnormal bleeding or anti-platelet use: denies    Menstrual history: No LMP for male patient.:     Steroid use in the last year: denies    Personal or FH with difficulty with Anesthesia:  denies    Prior to Admission Medications  Current Outpatient Medications   Medication Sig Dispense Refill     atorvastatin (LIPITOR) 10 MG tablet Take 10 mg by mouth every evening        enalapril (VASOTEC) 2.5 MG tablet Take 2.5 mg by mouth every morning        metFORMIN (GLUCOPHAGE) 500 MG tablet Take 500 mg by mouth 2 times daily (with meals)          Allergies  No Known Allergies    Social History  Social History     Socioeconomic History     Marital status:      Spouse name: Not on file     Number of children: Not on file     Years of education: Not on file     Highest education level: Not on file   Occupational History     Not on file   Social Needs     Financial resource strain: Not on file     Food insecurity     Worry: Not on file     Inability: Not on file     Transportation needs     Medical: Not on file     Non-medical: Not on file   Tobacco Use     Smoking status: Never Smoker     Smokeless tobacco: Never Used   Substance and Sexual Activity     Alcohol use: Yes     Comment: very little     Drug use: Not Currently     Sexual activity: Not on file   Lifestyle     Physical activity     Days per week: Not on file     Minutes per session: Not on file     Stress: Not on file   Relationships     Social connections     Talks on phone: Not on file     Gets together: Not on file     Attends Druze service: Not on file     Active member of club or organization: Not on file     Attends meetings of clubs or organizations: Not on file     Relationship status: Not on file     Intimate partner violence     Fear of  "current or ex partner: Not on file     Emotionally abused: Not on file     Physically abused: Not on file     Forced sexual activity: Not on file   Other Topics Concern     Parent/sibling w/ CABG, MI or angioplasty before 65F 55M? Not Asked   Social History Narrative     Not on file       Family History  Family History   Problem Relation Age of Onset     Dementia Mother      Cancer Sister            Anesthesia Evaluation     . Pt has had prior anesthetic.     No history of anesthetic complications          ROS/MED HX  The complete review of systems is negative other than noted in the HPI or here.   ENT/Pulmonary:  - neg pulmonary ROS    (-) tobacco use   Neurologic:     (+)other neuro tremor    Cardiovascular:     (+) Dyslipidemia, hypertension----. : . . . :. . No previous cardiac testing       METS/Exercise Tolerance: Comment: Uses stationary bike, walks, light weight training  Exercises daily >4 METS   Hematologic:  - neg hematologic  ROS      (-) history of blood clots and History of Transfusion   Musculoskeletal: Comment: Chronic low back pain        GI/Hepatic:  - neg GI/hepatic ROS       Renal/Genitourinary:  - ROS Renal section negative       Endo:     (+) type II DM .   (-) thyroid disease   Psychiatric:         Infectious Disease:   (+) Other Infectious Disease h/o Lyme disease      Malignancy:      - no malignancy   Other:    (+) no H/O Chronic Pain,           PHYSICAL EXAM:   Mental Status/Neuro: A/A/O; Age Appropriate   Airway: Facies: Feasible  Mallampati: I  Mouth/Opening: Full  TM distance: > 6 cm  Neck ROM: Full   Respiratory: Auscultation: CTAB     Resp. Rate: Normal     Resp. Effort: Normal      CV: Rhythm: Regular  Rate: Age appropriate  Heart: Normal Sounds  Edema: None   Comments:      Dental: Normal Dentition              Temp: 98.2  F (36.8  C) Temp src: Oral BP: 139/72 Pulse: 60   Resp: 14 SpO2: 96 %         197 lbs 0 oz  5' 8\"   Body mass index is 29.95 kg/m .       Physical " Exam  Constitutional: Awake, alert, cooperative, no apparent distress, and appears stated age.  Eyes: Pupils equal, round and reactive to light, extra ocular muscles intact, sclera clear, conjunctiva normal.  HENT: Normocephalic, oral pharynx with moist mucus membranes, good dentition. No goiter appreciated.   Respiratory: Clear to auscultation bilaterally, no crackles or wheezing.  Cardiovascular: Regular rate and rhythm, normal S1 and S2, and no murmur noted.  Carotids +2, no bruits. No edema. Palpable pulses to radial  DP and PT arteries.   GI: Normal bowel sounds, soft abodomen  Lymph/Hematologic: No cervical lymphadenopathy and no supraclavicular lymphadenopathy.  Genitourinary:  deferred  Skin: Warm and dry.  No rashes at anticipated surgical site.   Musculoskeletal: Full ROM of neck. There is no redness, warmth, or swelling of the joints. Gross motor strength is normal.    Neurologic: Awake, alert, oriented to name, place and time. Cranial nerves II-XII are grossly intact. Gait is normal.   Neuropsychiatric: Calm, cooperative. Normal affect.     Labs: (personally reviewed)  Component      Latest Ref Rng & Units 9/28/2020   WBC      4.0 - 11.0 10e9/L 5.0   RBC Count      4.4 - 5.9 10e12/L 4.99   Hemoglobin      13.3 - 17.7 g/dL 15.3   Hematocrit      40.0 - 53.0 % 46.4   MCV      78 - 100 fl 93   MCH      26.5 - 33.0 pg 30.7   MCHC      31.5 - 36.5 g/dL 33.0   RDW      10.0 - 15.0 % 12.1   Platelet Count      150 - 450 10e9/L 238     Component      Latest Ref Rng & Units 9/28/2020   INR      0.86 - 1.14 0.99     Component      Latest Ref Rng & Units 9/28/2020   Sodium      133 - 144 mmol/L 138   Potassium      3.4 - 5.3 mmol/L 4.0   Chloride      94 - 109 mmol/L 106   Carbon Dioxide      20 - 32 mmol/L 26   Anion Gap      3 - 14 mmol/L 6   Glucose      70 - 99 mg/dL 204 (H)   Urea Nitrogen      7 - 30 mg/dL 23   Creatinine      0.66 - 1.25 mg/dL 0.74   GFR Estimate      >60 mL/min/1.73:m2 >90   GFR Estimate If  Black      >60 mL/min/1.73:m2 >90   Calcium      8.5 - 10.1 mg/dL 9.1     Component      Latest Ref Rng & Units 9/28/2020   N-Terminal Pro Bnp      0 - 125 pg/mL 38     Component      Latest Ref Rng & Units 9/28/2020   Hemoglobin A1C      0 - 5.6 % 6.4 (H)         Outside records reviewed from: n/a    ASSESSMENT and PLAN  Duane Faymoville is a 74 year old male scheduled for DBS placement, left on 10/6 under MAC with local and DBS generator placement left chest wall under general anesthesia 10/13 by Dr. Mckeon in treatment of tremor.  PAC referral for risk assessment and optimization for anesthesia with comorbid conditions of hypertension, dyslipidemia, and NIDDM:    Pre-operative considerations:  1.  Cardiac:  Functional status- METS >4.  Pt is very active. He exercises daily with walking/stationary biking.  He pushes the .   Intermediate risk surgery with 0.4% (RCRI #) risk of major adverse cardiac event.   Hypertension, will hold enalapril for phase 2, general anesthesia.  Lipitor at bedtime.  2.  Pulm:  Airway feasible.  YONNY risk: Intermediate.  Never smoker.  3.  GI:  Risk of PONV score =  1.  If > 2, anti-emetic intervention recommended.  4.  Neuro: h/o tremor, right arm.  Left hand now as well.  No parkinsonism, no resting tremor.    VTE risk: 3%  (age, gender, FH of VTE)    Patient is optimized and is acceptable candidate for the proposed procedure.  No further diagnostic evaluation is needed.       Helene Grullon PA-C  Preoperative Assessment Center  [facility]  Clinic and Surgery Center  Phone: [phone]  Fax: [phone]

## 2020-09-28 NOTE — PATIENT INSTRUCTIONS
Preparing for Your Surgery      Name:  Duane Faymoville   MRN:  [id number]   :  1945   Today's Date:  2020       Arriving for surgery:  Surgery date:  10/6/20  Arrival time:  6:30 am    Restrictions due to COVID 19:  Patients are allowed one visitor in the pre-op period  All visitors must wear a mask  No visitors under 18  No ill visitors   parking is not available     Please come to:       [facility] [address]  [address] [place]  [address]       -    Please proceed to the Surgery Lounge on the 3rd floor. [phone]?     - ?If you are in need of directions, wheelchair or escort please stop at the Information Desk in the lobby.  Inform the information person that you are here for surgery; a wheelchair and escort will be provided to the Surgery Lounge .?     What can I eat or drink?  -  You may eat and drink normally for up to 8 hours before your surgery. (Until midnight)  -  You may have clear liquids until 2 hours before surgery. (Until 6:30 am)    Examples of clear liquids:  Water  Clear broth  Juices (apple, white grape, white cranberry  and cider) without pulp  Noncarbonated, powder based beverages  (lemonade and Rubén-Aid)  Sodas (Sprite, 7-Up, ginger ale and seltzer)  Coffee or tea (without milk or cream)  Gatorade    -  No Alcohol for at least 24 hours before surgery     Which medicines can I take?    Hold Aspirin for 7 days before surgery.   Hold Multivitamins for 7 days before surgery.  Hold Supplements for 7 days before surgery.  Hold Ibuprofen (Advil, Motrin) for 1 day before surgery.  Hold Naproxen (Aleve) for 4 days before surgery.    -  DO NOT take these medications the day of surgery:          Metformin (Glucophage)      - TAKE these medications the day of surgery:     Enalapril (Vasotec)    Enalapril (Vasotec)    How do I prepare myself?  - Please take 2 showers before surgery using Scrubcare or Hibiclens soap.    Use this soap only from  the neck to your toes.     Leave the soap on your skin for one minute--then rinse thoroughly.      You may use your own shampoo and conditioner; no other hair products.   - Please remove all jewelry and body piercings.  - No lotions, deodorants or fragrance.  - Bring your ID and insurance card.    - All patients are required to have a Covid-19 test within 4 days of surgery/procedure.      -Patients will be contacted by the [facility] scheduling team within 1 week of surgery to make an appointment.      - Patients may call the Scheduling team at [phone] if they have not been scheduled within 4 days of  surgery.      ALL PATIENTS GOING HOME THE SAME DAY OF SURGERY ARE REQUIRED TO HAVE A RESPONSIBLE ADULT TO DRIVE AND BE IN ATTENDANCE WITH THEM FOR 24 HOURS FOLLOWING SURGERY     Questions or Concerns:    - For any questions regarding the day of surgery or your hospital stay, please contact the Pre Admission Nursing Office at [phone].       - If you have health changes between today and your surgery please call your surgeon.       For questions after surgery please call your surgeons office.

## 2020-09-29 ENCOUNTER — DOCUMENTATION ONLY (OUTPATIENT)
Dept: NEUROSURGERY | Facility: CLINIC | Age: 75
End: 2020-09-29

## 2020-10-03 DIAGNOSIS — Z11.59 ENCOUNTER FOR SCREENING FOR OTHER VIRAL DISEASES: ICD-10-CM

## 2020-10-03 PROCEDURE — U0003 INFECTIOUS AGENT DETECTION BY NUCLEIC ACID (DNA OR RNA); SEVERE ACUTE RESPIRATORY SYNDROME CORONAVIRUS 2 (SARS-COV-2) (CORONAVIRUS DISEASE [COVID-19]), AMPLIFIED PROBE TECHNIQUE, MAKING USE OF HIGH THROUGHPUT TECHNOLOGIES AS DESCRIBED BY CMS-2020-01-R: HCPCS | Performed by: NEUROLOGICAL SURGERY

## 2020-10-04 LAB
SARS-COV-2 RNA SPEC QL NAA+PROBE: NOT DETECTED
SPECIMEN SOURCE: NORMAL

## 2020-10-06 ENCOUNTER — APPOINTMENT (OUTPATIENT)
Dept: CT IMAGING | Facility: CLINIC | Age: 75
DRG: 027 | End: 2020-10-06
Attending: STUDENT IN AN ORGANIZED HEALTH CARE EDUCATION/TRAINING PROGRAM
Payer: MEDICARE

## 2020-10-06 ENCOUNTER — APPOINTMENT (OUTPATIENT)
Dept: GENERAL RADIOLOGY | Facility: CLINIC | Age: 75
DRG: 027 | End: 2020-10-06
Attending: STUDENT IN AN ORGANIZED HEALTH CARE EDUCATION/TRAINING PROGRAM
Payer: MEDICARE

## 2020-10-06 ENCOUNTER — APPOINTMENT (OUTPATIENT)
Dept: GENERAL RADIOLOGY | Facility: CLINIC | Age: 75
DRG: 027 | End: 2020-10-06
Attending: NEUROLOGICAL SURGERY
Payer: MEDICARE

## 2020-10-06 ENCOUNTER — ANESTHESIA (OUTPATIENT)
Dept: SURGERY | Facility: CLINIC | Age: 75
DRG: 027 | End: 2020-10-06
Payer: MEDICARE

## 2020-10-06 ENCOUNTER — HOSPITAL ENCOUNTER (INPATIENT)
Facility: CLINIC | Age: 75
LOS: 1 days | Discharge: HOME | DRG: 027 | End: 2020-10-07
Attending: NEUROLOGICAL SURGERY | Admitting: NEUROLOGICAL SURGERY
Payer: MEDICARE

## 2020-10-06 ENCOUNTER — HOSPITAL ENCOUNTER (OUTPATIENT)
Dept: CT IMAGING | Facility: CLINIC | Age: 75
DRG: 027 | End: 2020-10-06
Attending: NEUROLOGICAL SURGERY | Admitting: NEUROLOGICAL SURGERY
Payer: MEDICARE

## 2020-10-06 DIAGNOSIS — R25.1 TREMOR: ICD-10-CM

## 2020-10-06 DIAGNOSIS — R25.1 TREMOR: Primary | ICD-10-CM

## 2020-10-06 LAB
GLUCOSE BLDC GLUCOMTR-MCNC: 116 MG/DL (ref 70–99)
GLUCOSE BLDC GLUCOMTR-MCNC: 139 MG/DL (ref 70–99)
INTERPRETATION ECG - MUSE: NORMAL

## 2020-10-06 PROCEDURE — 250N000009 HC RX 250: Performed by: NEUROLOGICAL SURGERY

## 2020-10-06 PROCEDURE — 360N000040 HC SURGERY LEVEL 6 1ST 30 MIN - UMMC: Performed by: NEUROLOGICAL SURGERY

## 2020-10-06 PROCEDURE — 250N000011 HC RX IP 250 OP 636: Performed by: NEUROLOGICAL SURGERY

## 2020-10-06 PROCEDURE — 8E09XBG COMPUTER ASSISTED PROCEDURE OF HEAD AND NECK REGION, WITH COMPUTERIZED TOMOGRAPHY: ICD-10-PCS | Performed by: NEUROLOGICAL SURGERY

## 2020-10-06 PROCEDURE — 999N000139 HC STATISTIC PRE-PROCEDURE ASSESSMENT II: Performed by: NEUROLOGICAL SURGERY

## 2020-10-06 PROCEDURE — 8E09XBH COMPUTER ASSISTED PROCEDURE OF HEAD AND NECK REGION, WITH MAGNETIC RESONANCE IMAGING: ICD-10-PCS | Performed by: NEUROLOGICAL SURGERY

## 2020-10-06 PROCEDURE — 70450 CT HEAD/BRAIN W/O DYE: CPT | Mod: 77

## 2020-10-06 PROCEDURE — 272N000001 HC OR GENERAL SUPPLY STERILE: Performed by: NEUROLOGICAL SURGERY

## 2020-10-06 PROCEDURE — 93010 ELECTROCARDIOGRAM REPORT: CPT | Mod: 59 | Performed by: INTERNAL MEDICINE

## 2020-10-06 PROCEDURE — 258N000003 HC RX IP 258 OP 636: Performed by: STUDENT IN AN ORGANIZED HEALTH CARE EDUCATION/TRAINING PROGRAM

## 2020-10-06 PROCEDURE — 278N000051 HC OR IMPLANT GENERAL: Performed by: NEUROLOGICAL SURGERY

## 2020-10-06 PROCEDURE — 120N000002 HC R&B MED SURG/OB UMMC

## 2020-10-06 PROCEDURE — 360N000041 HC SURGERY LEVEL 6 EA 15 ADDTL MIN - UMMC: Performed by: NEUROLOGICAL SURGERY

## 2020-10-06 PROCEDURE — 272N000004 HC RX 272: Performed by: NEUROLOGICAL SURGERY

## 2020-10-06 PROCEDURE — 70450 CT HEAD/BRAIN W/O DYE: CPT | Mod: 26 | Performed by: RADIOLOGY

## 2020-10-06 PROCEDURE — 761N000003 HC RECOVERY PHASE 1 LEVEL 2 FIRST HR: Performed by: NEUROLOGICAL SURGERY

## 2020-10-06 PROCEDURE — 370N000002 HC ANESTHESIA TECHNICAL FEE, EACH ADDTL 15 MIN: Performed by: NEUROLOGICAL SURGERY

## 2020-10-06 PROCEDURE — 250N000009 HC RX 250: Performed by: STUDENT IN AN ORGANIZED HEALTH CARE EDUCATION/TRAINING PROGRAM

## 2020-10-06 PROCEDURE — C1778 LEAD, NEUROSTIMULATOR: HCPCS | Performed by: NEUROLOGICAL SURGERY

## 2020-10-06 PROCEDURE — 999N000179 XR SURGERY CARM FLUORO LESS THAN 5 MIN W STILLS: Mod: TC

## 2020-10-06 PROCEDURE — 999N000128 HC STATISTIC PERIPHERAL IV START W/O US GUIDANCE

## 2020-10-06 PROCEDURE — 370N000001 HC ANESTHESIA TECHNICAL FEE, 1ST 30 MIN: Performed by: NEUROLOGICAL SURGERY

## 2020-10-06 PROCEDURE — 250N000011 HC RX IP 250 OP 636: Performed by: STUDENT IN AN ORGANIZED HEALTH CARE EDUCATION/TRAINING PROGRAM

## 2020-10-06 PROCEDURE — 00H03MZ INSERTION OF NEUROSTIMULATOR LEAD INTO BRAIN, PERCUTANEOUS APPROACH: ICD-10-PCS | Performed by: NEUROLOGICAL SURGERY

## 2020-10-06 PROCEDURE — 61867 IMPLANT NEUROELECTRODE: CPT | Mod: LT | Performed by: NEUROLOGICAL SURGERY

## 2020-10-06 PROCEDURE — 70450 CT HEAD/BRAIN W/O DYE: CPT

## 2020-10-06 PROCEDURE — 250N000013 HC RX MED GY IP 250 OP 250 PS 637: Performed by: STUDENT IN AN ORGANIZED HEALTH CARE EDUCATION/TRAINING PROGRAM

## 2020-10-06 PROCEDURE — 999N001017 HC STATISTIC GLUCOSE BY METER IP

## 2020-10-06 PROCEDURE — 999N000065 XR SKULL 1/3 VW

## 2020-10-06 PROCEDURE — 70250 X-RAY EXAM OF SKULL: CPT | Mod: 26 | Performed by: RADIOLOGY

## 2020-10-06 DEVICE — IMP BUR HOLE COVER GUARDIAN 6010ANS: Type: IMPLANTABLE DEVICE | Site: CRANIAL | Status: FUNCTIONAL

## 2020-10-06 DEVICE — KIT DBS LEAD DBS 8CH 40CM 1-3,3-1 SPACE 0.5 BLACK 6172ANS: Type: IMPLANTABLE DEVICE | Site: CRANIAL | Status: FUNCTIONAL

## 2020-10-06 RX ORDER — SODIUM CHLORIDE 9 MG/ML
INJECTION, SOLUTION INTRAVENOUS CONTINUOUS
Status: ACTIVE | OUTPATIENT
Start: 2020-10-06 | End: 2020-10-07

## 2020-10-06 RX ORDER — PROCHLORPERAZINE MALEATE 5 MG
5 TABLET ORAL EVERY 6 HOURS PRN
Status: DISCONTINUED | OUTPATIENT
Start: 2020-10-06 | End: 2020-10-07 | Stop reason: HOSPADM

## 2020-10-06 RX ORDER — POTASSIUM CHLORIDE 750 MG/1
20-40 TABLET, EXTENDED RELEASE ORAL
Status: DISCONTINUED | OUTPATIENT
Start: 2020-10-06 | End: 2020-10-07 | Stop reason: HOSPADM

## 2020-10-06 RX ORDER — ONDANSETRON 2 MG/ML
4 INJECTION INTRAMUSCULAR; INTRAVENOUS EVERY 30 MIN PRN
Status: DISCONTINUED | OUTPATIENT
Start: 2020-10-06 | End: 2020-10-06 | Stop reason: HOSPADM

## 2020-10-06 RX ORDER — SODIUM CHLORIDE, SODIUM LACTATE, POTASSIUM CHLORIDE, CALCIUM CHLORIDE 600; 310; 30; 20 MG/100ML; MG/100ML; MG/100ML; MG/100ML
INJECTION, SOLUTION INTRAVENOUS CONTINUOUS
Status: DISCONTINUED | OUTPATIENT
Start: 2020-10-06 | End: 2020-10-06 | Stop reason: HOSPADM

## 2020-10-06 RX ORDER — PROPOFOL 10 MG/ML
INJECTION, EMULSION INTRAVENOUS PRN
Status: DISCONTINUED | OUTPATIENT
Start: 2020-10-06 | End: 2020-10-06

## 2020-10-06 RX ORDER — POTASSIUM CHLORIDE 29.8 MG/ML
20 INJECTION INTRAVENOUS
Status: DISCONTINUED | OUTPATIENT
Start: 2020-10-06 | End: 2020-10-07 | Stop reason: HOSPADM

## 2020-10-06 RX ORDER — LIDOCAINE 40 MG/G
CREAM TOPICAL
Status: DISCONTINUED | OUTPATIENT
Start: 2020-10-06 | End: 2020-10-07 | Stop reason: HOSPADM

## 2020-10-06 RX ORDER — CEFAZOLIN SODIUM 1 G/3ML
1 INJECTION, POWDER, FOR SOLUTION INTRAMUSCULAR; INTRAVENOUS EVERY 8 HOURS
Status: COMPLETED | OUTPATIENT
Start: 2020-10-06 | End: 2020-10-07

## 2020-10-06 RX ORDER — CEFAZOLIN SODIUM 2 G/100ML
2 INJECTION, SOLUTION INTRAVENOUS
Status: COMPLETED | OUTPATIENT
Start: 2020-10-06 | End: 2020-10-06

## 2020-10-06 RX ORDER — POTASSIUM CHLORIDE 1.5 G/1.58G
20-40 POWDER, FOR SOLUTION ORAL
Status: DISCONTINUED | OUTPATIENT
Start: 2020-10-06 | End: 2020-10-07 | Stop reason: HOSPADM

## 2020-10-06 RX ORDER — SODIUM CHLORIDE, SODIUM LACTATE, POTASSIUM CHLORIDE, CALCIUM CHLORIDE 600; 310; 30; 20 MG/100ML; MG/100ML; MG/100ML; MG/100ML
INJECTION, SOLUTION INTRAVENOUS CONTINUOUS PRN
Status: DISCONTINUED | OUTPATIENT
Start: 2020-10-06 | End: 2020-10-06

## 2020-10-06 RX ORDER — ONDANSETRON 4 MG/1
4-8 TABLET, ORALLY DISINTEGRATING ORAL EVERY 6 HOURS PRN
Status: DISCONTINUED | OUTPATIENT
Start: 2020-10-06 | End: 2020-10-07 | Stop reason: HOSPADM

## 2020-10-06 RX ORDER — ACETAMINOPHEN 325 MG/1
975 TABLET ORAL EVERY 8 HOURS
Status: DISCONTINUED | OUTPATIENT
Start: 2020-10-06 | End: 2020-10-07 | Stop reason: HOSPADM

## 2020-10-06 RX ORDER — ATORVASTATIN CALCIUM 10 MG/1
10 TABLET, FILM COATED ORAL EVERY EVENING
Status: DISCONTINUED | OUTPATIENT
Start: 2020-10-06 | End: 2020-10-07 | Stop reason: HOSPADM

## 2020-10-06 RX ORDER — FENTANYL CITRATE 50 UG/ML
25-50 INJECTION, SOLUTION INTRAMUSCULAR; INTRAVENOUS
Status: DISCONTINUED | OUTPATIENT
Start: 2020-10-06 | End: 2020-10-06 | Stop reason: HOSPADM

## 2020-10-06 RX ORDER — AMOXICILLIN 250 MG
2 CAPSULE ORAL 2 TIMES DAILY
Status: DISCONTINUED | OUTPATIENT
Start: 2020-10-06 | End: 2020-10-07 | Stop reason: HOSPADM

## 2020-10-06 RX ORDER — LIDOCAINE 40 MG/G
CREAM TOPICAL
Status: DISCONTINUED | OUTPATIENT
Start: 2020-10-06 | End: 2020-10-06 | Stop reason: HOSPADM

## 2020-10-06 RX ORDER — POTASSIUM CL/LIDO/0.9 % NACL 10MEQ/0.1L
10 INTRAVENOUS SOLUTION, PIGGYBACK (ML) INTRAVENOUS
Status: DISCONTINUED | OUTPATIENT
Start: 2020-10-06 | End: 2020-10-07 | Stop reason: HOSPADM

## 2020-10-06 RX ORDER — NALOXONE HYDROCHLORIDE 0.4 MG/ML
.1-.4 INJECTION, SOLUTION INTRAMUSCULAR; INTRAVENOUS; SUBCUTANEOUS
Status: DISCONTINUED | OUTPATIENT
Start: 2020-10-06 | End: 2020-10-07 | Stop reason: HOSPADM

## 2020-10-06 RX ORDER — DEXMEDETOMIDINE HYDROCHLORIDE 4 UG/ML
0.2-0.7 INJECTION, SOLUTION INTRAVENOUS CONTINUOUS
Status: DISCONTINUED | OUTPATIENT
Start: 2020-10-06 | End: 2020-10-06

## 2020-10-06 RX ORDER — ONDANSETRON 4 MG/1
4 TABLET, ORALLY DISINTEGRATING ORAL EVERY 30 MIN PRN
Status: DISCONTINUED | OUTPATIENT
Start: 2020-10-06 | End: 2020-10-06 | Stop reason: HOSPADM

## 2020-10-06 RX ORDER — OXYCODONE HYDROCHLORIDE 5 MG/1
5-10 TABLET ORAL EVERY 4 HOURS PRN
Status: DISCONTINUED | OUTPATIENT
Start: 2020-10-06 | End: 2020-10-07 | Stop reason: HOSPADM

## 2020-10-06 RX ORDER — CEFAZOLIN SODIUM 1 G/3ML
1 INJECTION, POWDER, FOR SOLUTION INTRAMUSCULAR; INTRAVENOUS SEE ADMIN INSTRUCTIONS
Status: DISCONTINUED | OUTPATIENT
Start: 2020-10-06 | End: 2020-10-06 | Stop reason: HOSPADM

## 2020-10-06 RX ORDER — PROCHLORPERAZINE 25 MG
12.5 SUPPOSITORY, RECTAL RECTAL EVERY 12 HOURS PRN
Status: DISCONTINUED | OUTPATIENT
Start: 2020-10-06 | End: 2020-10-07 | Stop reason: HOSPADM

## 2020-10-06 RX ORDER — MAGNESIUM SULFATE HEPTAHYDRATE 40 MG/ML
4 INJECTION, SOLUTION INTRAVENOUS EVERY 4 HOURS PRN
Status: DISCONTINUED | OUTPATIENT
Start: 2020-10-06 | End: 2020-10-07 | Stop reason: HOSPADM

## 2020-10-06 RX ORDER — LIDOCAINE HYDROCHLORIDE AND EPINEPHRINE 10; 10 MG/ML; UG/ML
INJECTION, SOLUTION INFILTRATION; PERINEURAL PRN
Status: DISCONTINUED | OUTPATIENT
Start: 2020-10-06 | End: 2020-10-06 | Stop reason: HOSPADM

## 2020-10-06 RX ORDER — HYDRALAZINE HYDROCHLORIDE 20 MG/ML
10-20 INJECTION INTRAMUSCULAR; INTRAVENOUS EVERY 30 MIN PRN
Status: DISCONTINUED | OUTPATIENT
Start: 2020-10-06 | End: 2020-10-07 | Stop reason: HOSPADM

## 2020-10-06 RX ORDER — ACETAMINOPHEN 325 MG/1
650 TABLET ORAL EVERY 4 HOURS PRN
Status: DISCONTINUED | OUTPATIENT
Start: 2020-10-09 | End: 2020-10-07 | Stop reason: HOSPADM

## 2020-10-06 RX ORDER — AMOXICILLIN 250 MG
1 CAPSULE ORAL 2 TIMES DAILY
Status: DISCONTINUED | OUTPATIENT
Start: 2020-10-06 | End: 2020-10-07 | Stop reason: HOSPADM

## 2020-10-06 RX ORDER — ENALAPRIL MALEATE 2.5 MG/1
2.5 TABLET ORAL EVERY MORNING
Status: DISCONTINUED | OUTPATIENT
Start: 2020-10-07 | End: 2020-10-07 | Stop reason: HOSPADM

## 2020-10-06 RX ORDER — EPHEDRINE SULFATE 50 MG/ML
INJECTION, SOLUTION INTRAMUSCULAR; INTRAVENOUS; SUBCUTANEOUS PRN
Status: DISCONTINUED | OUTPATIENT
Start: 2020-10-06 | End: 2020-10-06

## 2020-10-06 RX ORDER — LABETALOL 20 MG/4 ML (5 MG/ML) INTRAVENOUS SYRINGE
10-40 EVERY 10 MIN PRN
Status: DISCONTINUED | OUTPATIENT
Start: 2020-10-06 | End: 2020-10-07 | Stop reason: HOSPADM

## 2020-10-06 RX ORDER — ONDANSETRON 2 MG/ML
4-8 INJECTION INTRAMUSCULAR; INTRAVENOUS EVERY 6 HOURS PRN
Status: DISCONTINUED | OUTPATIENT
Start: 2020-10-06 | End: 2020-10-07 | Stop reason: HOSPADM

## 2020-10-06 RX ORDER — POTASSIUM CHLORIDE 7.45 MG/ML
10 INJECTION INTRAVENOUS
Status: DISCONTINUED | OUTPATIENT
Start: 2020-10-06 | End: 2020-10-07 | Stop reason: HOSPADM

## 2020-10-06 RX ORDER — POLYETHYLENE GLYCOL 3350 17 G/17G
17 POWDER, FOR SOLUTION ORAL DAILY
Status: DISCONTINUED | OUTPATIENT
Start: 2020-10-06 | End: 2020-10-07 | Stop reason: HOSPADM

## 2020-10-06 RX ADMIN — LABETALOL 20 MG/4 ML (5 MG/ML) INTRAVENOUS SYRINGE 10 MG: at 19:14

## 2020-10-06 RX ADMIN — ACETAMINOPHEN 975 MG: 325 TABLET, FILM COATED ORAL at 17:55

## 2020-10-06 RX ADMIN — ATORVASTATIN CALCIUM 10 MG: 10 TABLET, FILM COATED ORAL at 19:13

## 2020-10-06 RX ADMIN — EPHEDRINE SULFATE 5 MG: 50 INJECTION, SOLUTION INTRAMUSCULAR; INTRAVENOUS; SUBCUTANEOUS at 10:20

## 2020-10-06 RX ADMIN — CEFAZOLIN 1 G: 1 INJECTION, POWDER, FOR SOLUTION INTRAMUSCULAR; INTRAVENOUS at 19:12

## 2020-10-06 RX ADMIN — DOCUSATE SODIUM 50 MG AND SENNOSIDES 8.6 MG 2 TABLET: 8.6; 5 TABLET, FILM COATED ORAL at 19:13

## 2020-10-06 RX ADMIN — OXYCODONE HYDROCHLORIDE 10 MG: 5 TABLET ORAL at 22:04

## 2020-10-06 RX ADMIN — LABETALOL 20 MG/4 ML (5 MG/ML) INTRAVENOUS SYRINGE 20 MG: at 19:35

## 2020-10-06 RX ADMIN — Medication 1 G: at 11:55

## 2020-10-06 RX ADMIN — Medication 2 G: at 09:48

## 2020-10-06 RX ADMIN — SODIUM CHLORIDE: 9 INJECTION, SOLUTION INTRAVENOUS at 14:09

## 2020-10-06 RX ADMIN — DEXMEDETOMIDINE 0.5 MCG/KG/HR: 100 INJECTION, SOLUTION, CONCENTRATE INTRAVENOUS at 09:30

## 2020-10-06 RX ADMIN — SODIUM CHLORIDE, SODIUM LACTATE, POTASSIUM CHLORIDE, CALCIUM CHLORIDE: 600; 310; 30; 20 INJECTION, SOLUTION INTRAVENOUS at 09:27

## 2020-10-06 RX ADMIN — PROPOFOL 80 MG: 10 INJECTION, EMULSION INTRAVENOUS at 08:41

## 2020-10-06 RX ADMIN — OXYCODONE HYDROCHLORIDE 10 MG: 5 TABLET ORAL at 17:55

## 2020-10-06 ASSESSMENT — ACTIVITIES OF DAILY LIVING (ADL): ADLS_ACUITY_SCORE: 15

## 2020-10-06 ASSESSMENT — MIFFLIN-ST. JEOR: SCORE: 1591.5

## 2020-10-06 NOTE — ANESTHESIA POSTPROCEDURE EVALUATION
Anesthesia POST Procedure Evaluation    Patient: Duane Faymoville   MRN:     [id number] Gender:   male   Age:    75 year old :      1945        Preoperative Diagnosis: Tremor [R25.1]   Procedure(s):  stealth assisted Left side deep brain stimulator placement, phase I, placement of left side deep brain stimulator electrode, target left ventral intermediate nucleus of the thalamus, with microelectrode recording   Postop Comments: No value filed.     Anesthesia Type: MAC       Disposition: Admission   Postop Pain Control: Uneventful            Sign Out: Well controlled pain   PONV: No   Neuro/Psych: Uneventful            Sign Out: Acceptable/Baseline neuro status   Airway/Respiratory: Uneventful            Sign Out: Acceptable/Baseline resp. status   CV/Hemodynamics: Uneventful            Sign Out: Acceptable CV status   Other NRE: NONE   DID A NON-ROUTINE EVENT OCCUR? No         Last Anesthesia Record Vitals:  CRNA VITALS  10/6/2020 1253 - 10/6/2020 1353      10/6/2020             Pulse:  60    Ht Rate:  59    Temp:  36.8  C (98.2  F)    SpO2:  98 %          Last PACU Vitals:  Vitals Value Taken Time   /64 10/06/20 1445   Temp 36.7  C (98  F) 10/06/20 1415   Pulse 57 10/06/20 1446   Resp 20 10/06/20 1415   SpO2 99 % 10/06/20 1448   Temp src     NIBP     Pulse     SpO2     Resp     Temp     Ht Rate     Temp 2     Vitals shown include unvalidated device data.      Electronically Signed By: Darin Amaral MD, 2020, 2:53 PM

## 2020-10-06 NOTE — BRIEF OP NOTE
[facility]     Brief Operative Note    Pre-operative diagnosis: Tremor [R25.1]  Post-operative diagnosis Same as pre-operative diagnosis    Procedure: Procedure(s):  stealth assisted Left side deep brain stimulator placement, phase I, placement of left side deep brain stimulator electrode, target left ventral intermediate nucleus of the thalamus, with microelectrode recording  Surgeon: Surgeon(s) and Role:     * Darin Mckeon MD - Primary     * Getachew Cmaacho MD - Resident - Assisting  Anesthesia: Combined MAC with Local   Estimated blood loss: 5 mL  Drains: None  Specimens: * No specimens in log *  Findings:   None.  Complications: None.  Implants:   Implant Name Type Inv. Item Serial No.  Lot No. LRB No. Used Action   IMP BUR HOLE COVER GUARDIAN [id number] Metallic Hardware/[place] IMP BUR HOLE COVER GUARDIAN [id number]  [employer] [id number] Left 1 Implanted   KIT DBS LEAD DBS 8CH 40CM 1-3,3-1 SPACE 0.5 BLACK [id number] Leads KIT DBS LEAD DBS 8CH 40CM 1-3,3-1 SPACE 0.5 BLACK [id number] [employer]  Left 1 Implanted

## 2020-10-06 NOTE — ANESTHESIA CARE TRANSFER NOTE
Patient: Duane Faymoville    Procedure(s):  stealth assisted Left side deep brain stimulator placement, phase I, placement of left side deep brain stimulator electrode, target left ventral intermediate nucleus of the thalamus, with microelectrode recording    Diagnosis: Tremor [R25.1]  Diagnosis Additional Information: No value filed.    Anesthesia Type:   No value filed.     Note:  Airway :Nasal Cannula  Patient transferred to:PACU  Comments: Duane Faymoville was brought to the PACU on 3L O2 via NC. VSS. Patient denies pain or nausea. Report given to PACU nurse. No apparent complications of anesthesia.    Faraz Lopez MD  Anesthesiology Resident, CA-1  Handoff Report: Identifed the Patient, Identified the Reponsible Provider, Reviewed the pertinent medical history, Discussed the surgical course, Reviewed Intra-OP anesthesia mangement and issues during anesthesia, Set expectations for post-procedure period and Allowed opportunity for questions and acknowledgement of understanding      Vitals: (Last set prior to Anesthesia Care Transfer)    CRNA VITALS  10/6/2020 1253 - 10/6/2020 1334      10/6/2020             Pulse:  60    Ht Rate:  59    Temp:  36.8  C (98.2  F)    SpO2:  98 %                Electronically Signed By: Faraz Lopez MD  October 6, 2020  1:34 PM

## 2020-10-07 ENCOUNTER — PATIENT OUTREACH (OUTPATIENT)
Dept: CARE COORDINATION | Facility: CLINIC | Age: 75
End: 2020-10-07

## 2020-10-07 ENCOUNTER — APPOINTMENT (OUTPATIENT)
Dept: CT IMAGING | Facility: CLINIC | Age: 75
DRG: 027 | End: 2020-10-07
Attending: STUDENT IN AN ORGANIZED HEALTH CARE EDUCATION/TRAINING PROGRAM
Payer: MEDICARE

## 2020-10-07 VITALS
TEMPERATURE: 96.9 F | SYSTOLIC BLOOD PRESSURE: 138 MMHG | WEIGHT: 192.24 LBS | OXYGEN SATURATION: 94 % | RESPIRATION RATE: 16 BRPM | DIASTOLIC BLOOD PRESSURE: 58 MMHG | HEART RATE: 57 BPM | HEIGHT: 68 IN | BODY MASS INDEX: 29.14 KG/M2

## 2020-10-07 LAB
ANION GAP SERPL CALCULATED.3IONS-SCNC: 5 MMOL/L (ref 3–14)
BUN SERPL-MCNC: 17 MG/DL (ref 7–30)
CALCIUM SERPL-MCNC: 8.6 MG/DL (ref 8.5–10.1)
CHLORIDE SERPL-SCNC: 107 MMOL/L (ref 94–109)
CO2 SERPL-SCNC: 25 MMOL/L (ref 20–32)
CREAT SERPL-MCNC: 0.72 MG/DL (ref 0.66–1.25)
ERYTHROCYTE [DISTWIDTH] IN BLOOD BY AUTOMATED COUNT: 12.2 % (ref 10–15)
GFR SERPL CREATININE-BSD FRML MDRD: >90 ML/MIN/{1.73_M2}
GLUCOSE BLDC GLUCOMTR-MCNC: 130 MG/DL (ref 70–99)
GLUCOSE SERPL-MCNC: 130 MG/DL (ref 70–99)
HCT VFR BLD AUTO: 44.4 % (ref 40–53)
HGB BLD-MCNC: 14.4 G/DL (ref 13.3–17.7)
MAGNESIUM SERPL-MCNC: 2 MG/DL (ref 1.6–2.3)
MCH RBC QN AUTO: 30.2 PG (ref 26.5–33)
MCHC RBC AUTO-ENTMCNC: 32.4 G/DL (ref 31.5–36.5)
MCV RBC AUTO: 93 FL (ref 78–100)
PHOSPHATE SERPL-MCNC: 2.9 MG/DL (ref 2.5–4.5)
PLATELET # BLD AUTO: 224 10E9/L (ref 150–450)
POTASSIUM SERPL-SCNC: 3.9 MMOL/L (ref 3.4–5.3)
RBC # BLD AUTO: 4.77 10E12/L (ref 4.4–5.9)
SODIUM SERPL-SCNC: 137 MMOL/L (ref 133–144)
WBC # BLD AUTO: 8.8 10E9/L (ref 4–11)

## 2020-10-07 PROCEDURE — 70450 CT HEAD/BRAIN W/O DYE: CPT | Mod: 26 | Performed by: RADIOLOGY

## 2020-10-07 PROCEDURE — 258N000003 HC RX IP 258 OP 636: Performed by: STUDENT IN AN ORGANIZED HEALTH CARE EDUCATION/TRAINING PROGRAM

## 2020-10-07 PROCEDURE — 999N001017 HC STATISTIC GLUCOSE BY METER IP

## 2020-10-07 PROCEDURE — 250N000013 HC RX MED GY IP 250 OP 250 PS 637: Performed by: STUDENT IN AN ORGANIZED HEALTH CARE EDUCATION/TRAINING PROGRAM

## 2020-10-07 PROCEDURE — 84100 ASSAY OF PHOSPHORUS: CPT | Performed by: NEUROLOGICAL SURGERY

## 2020-10-07 PROCEDURE — 80048 BASIC METABOLIC PNL TOTAL CA: CPT | Performed by: NEUROLOGICAL SURGERY

## 2020-10-07 PROCEDURE — 250N000011 HC RX IP 250 OP 636: Performed by: STUDENT IN AN ORGANIZED HEALTH CARE EDUCATION/TRAINING PROGRAM

## 2020-10-07 PROCEDURE — 36415 COLL VENOUS BLD VENIPUNCTURE: CPT | Performed by: NEUROLOGICAL SURGERY

## 2020-10-07 PROCEDURE — 70450 CT HEAD/BRAIN W/O DYE: CPT

## 2020-10-07 PROCEDURE — 85027 COMPLETE CBC AUTOMATED: CPT | Performed by: NEUROLOGICAL SURGERY

## 2020-10-07 PROCEDURE — 83735 ASSAY OF MAGNESIUM: CPT | Performed by: NEUROLOGICAL SURGERY

## 2020-10-07 RX ORDER — OXYCODONE HYDROCHLORIDE 5 MG/1
5-10 TABLET ORAL EVERY 4 HOURS PRN
Qty: 30 TABLET | Refills: 0 | Status: ON HOLD | OUTPATIENT
Start: 2020-10-07 | End: 2020-10-13

## 2020-10-07 RX ORDER — AMOXICILLIN 250 MG
2 CAPSULE ORAL 2 TIMES DAILY
Qty: 30 TABLET | Refills: 0 | Status: SHIPPED | OUTPATIENT
Start: 2020-10-07

## 2020-10-07 RX ADMIN — OXYCODONE HYDROCHLORIDE 10 MG: 5 TABLET ORAL at 02:03

## 2020-10-07 RX ADMIN — CEFAZOLIN 1 G: 1 INJECTION, POWDER, FOR SOLUTION INTRAMUSCULAR; INTRAVENOUS at 11:07

## 2020-10-07 RX ADMIN — OXYCODONE HYDROCHLORIDE 10 MG: 5 TABLET ORAL at 06:32

## 2020-10-07 RX ADMIN — ACETAMINOPHEN 975 MG: 325 TABLET, FILM COATED ORAL at 10:05

## 2020-10-07 RX ADMIN — ACETAMINOPHEN 975 MG: 325 TABLET, FILM COATED ORAL at 02:03

## 2020-10-07 RX ADMIN — ENALAPRIL MALEATE 2.5 MG: 2.5 TABLET ORAL at 07:46

## 2020-10-07 RX ADMIN — DOCUSATE SODIUM 50 MG AND SENNOSIDES 8.6 MG 2 TABLET: 8.6; 5 TABLET, FILM COATED ORAL at 07:46

## 2020-10-07 RX ADMIN — SODIUM CHLORIDE: 9 INJECTION, SOLUTION INTRAVENOUS at 02:03

## 2020-10-07 RX ADMIN — CEFAZOLIN 1 G: 1 INJECTION, POWDER, FOR SOLUTION INTRAMUSCULAR; INTRAVENOUS at 03:41

## 2020-10-07 ASSESSMENT — ACTIVITIES OF DAILY LIVING (ADL)
ADLS_ACUITY_SCORE: 15

## 2020-10-07 NOTE — PLAN OF CARE
Status: POD #1 L side DBS phase 1.    Vitals: VSS.   Neuros: Intact. A&Ox4. Denies N/T. 5/5 throughout. No R hand tremor, pt reports it has resolved.   IV: PIV infusing 100ml/hr NS  Resp/trach: 1L NC, weaned to RA overnight.   Diet: Regular   Bowel status: Last BM 10/5 per pt   : Voids spontaneously   Skin: 4 pin sites. 2 anterior, 2 posterior.  L head incision JAVIER, closed with sutures.   Pain: PRN Tylenol and Oxy   Activity: SBA, steady on feet  Plan: CT done this a.m. Discharge to home when medically cleared.

## 2020-10-07 NOTE — PROGRESS NOTES
Arrived from:  PACU    Belongings/meds:  Clothing, shoes, phone    2 RN Skin Assessment Completed by:  Rochelle CULLEN and Esperanza DELUCA    Non-intact findings documented: 2 Anterior pin sites covered with primapore. Incision to L side of head open to air with sutures and liquid glue. 2 Posterior pin sites JAVIER

## 2020-10-07 NOTE — PLAN OF CARE
Status: Patient is POD#0 L side deep brain stimulator placement, phase I  Vitals: VSS ex HTN, PRN Labetolol given x2. BP should be < 140  Neuros: A&Ox4. 5/5 strengths throughout. Denies N/T. Per report, hx of tremors to the R hand but not noticeable during assessment. Generalized weakness  IV: PIV infusing NS @100mL/hr  Resp/trach: On 1L NC, O2 sats 95%  Diet: Regular diet, tolerating well  Bowel status: Last BM 10/5 per patient  : Voiding spontaneously via bathroom  Skin: 2 Anterior pin sites covered with primapore. Incision to L side of head open to air with sutures and liquid glue. 2 Posterior pin sites JAVIER  Pain: PRN Tylenol and Oxycodone given  Activity: Up with assist of 1 and GB  Plan: Continue to monitor and follow POC

## 2020-10-07 NOTE — PROGRESS NOTES
Neurosurgery progress note    S: Endorses incisional pain    O:  Temp:  [97.5  F (36.4  C)-98.4  F (36.9  C)] 97.5  F (36.4  C)  Pulse:  [56-73] 61  Resp:  [15-25] 16  BP: (112-171)/(53-75) 142/66  SpO2:  [95 %-100 %] 95 %    Exam:  General: Awake;  Alert, In No Acute Distress  Pulm: Breathing Comfortably on RA  Mental status: Oriented x 3  Cranial Nerves: Cranial Nerves II-XII Intact Bilaterally  Strength:       Delt Bi Tri Hand Flexion/  Extension Iliopsoas Quadriceps Tibialis Anterior EHL Gastroc    C5 C6 C7 C8/T1 L2 L3 L4 L5 S1   R 5 5 5 5 5 5 5 5 5   L 5 5 5 5 5 5 5 5 5     Pronator Drift: Absent  Sensory: Intact to Light Touch  Reflexes: No Hyperreflexia, Rodríguez s or Clonus Present; Toes Down-Going Bilaterally  INCISION: dressing c/d/i    Assessment:   Duane Faymoville is a 75 year old male who is s/p phase 1 DBS target left ventral intermediate nucleus of the thalamus. Patient is doing well post-op. First post op CT showed small SDH repeat CTH stable    Plan for discharge    Saw Gruber MD  Neurosurgery    Please contact neurosurgery resident on call with questions.    Dial * * [phone], enter [phone] when prompted

## 2020-10-07 NOTE — DISCHARGE SUMMARY
"[facility] Discharge Summary    Duane Faymoville MRN# [id number]   Age: 75 year old YOB: 1945     Date of Admission:  10/6/2020  Date of Discharge::  10/7/2020  Admitting Physician:  Darin Mckeon MD PHD  Discharge Physician:   Darin Mckeon MD PHD            Admission Diagnoses:   Tremor [R25.1]              Discharge Diagnosis:   Tremor [R25.1]          Procedures:   10/6: stealth assisted Left side deep brain stimulator placement, phase I, placement of left side deep brain stimulator electrode, target left ventral intermediate nucleus of the thalamus, with microelectrode recording           Medications Prior to Admission:     Medications Prior to Admission   Medication Sig Dispense Refill Last Dose     atorvastatin (LIPITOR) 10 MG tablet Take 10 mg by mouth every evening    10/5/2020 at 2000     enalapril (VASOTEC) 2.5 MG tablet Take 2.5 mg by mouth every morning    10/6/2020 at 0500     metFORMIN (GLUCOPHAGE) 500 MG tablet Take 500 mg by mouth 2 times daily (with meals)    10/5/2020 at 2000             Discharge Medications:     Current Discharge Medication List      CONTINUE these medications which have NOT CHANGED    Details   atorvastatin (LIPITOR) 10 MG tablet Take 10 mg by mouth every evening       enalapril (VASOTEC) 2.5 MG tablet Take 2.5 mg by mouth every morning       metFORMIN (GLUCOPHAGE) 500 MG tablet Take 500 mg by mouth 2 times daily (with meals)          Exam:   Physical Exam  /58   Pulse 57   Temp 96.9  F (36.1  C) (Oral)   Resp 16   Ht 1.727 m (5' 8\")   Wt 87.2 kg (192 lb 3.9 oz)   SpO2 94%   BMI 29.23 kg/m    General: Appears comfortable, NAD  Wound: Incision, clean, dry, intact without strikethrough  Neurologic Exam:  - AOx3.  - Follows commands.  - Speech fluent, spontaneous. No aphasia or dysarthria.  - No gaze preference. No apparent hemineglect.  - PERRL, EOMI.  - Face symmetric with sensation intact to light touch.  - Palate elevates " symmetrically, uvula midline, tongue protrudes midline.  - Trapezii and sternocleidomastoid muscles 5/5 bilaterally.  - No pronator drift.  Motor: Normal bulk/tone; no tremor, rigidity, or bradykinesia.   Right:  Deltoid 5/5, tricep 5/5, bicep 5/5, wrist flexor 5/5, wrist extensor 5/5, finger intrinsic 5/5  Left:  Deltoid 5/5, tricep 5/5, bicep 5/5, wrist flexor 5/5, wrist extensor 5/5, finger intrinsic 5/5  Right: Iliopsoas 5/5, quadricep 5/5, hamstring 5/5, tibialis anterior 5/5, gastrocnemius 5/5, EHL 5/5  Left:  Iliopsoas 5/5, quadricep 5/5, hamstring 5/5, tibialis anterior 5/5, gastrocnemius 5/5, EHL 5/5    Sensation intact in bilateral L4-S1 dermatones               Consultations:   None          Brief History of Illness:   Duane Faymoville is a 74 year old male who presents for  DBS placement, left on 10/6.   This is a 74 year old male with PMH significant for hypertension, dyslipidemia, and DM.  He began having postural tremors of the right arm about 10 years ago.  The tremors get worse with stress/anxiety.  He denies tremor anywhere else in his body.  He reports that he know has tremor in the left hand as well.  His tremor significantly affects his everyday life. He has difficulty writing and holding objects. He has difficulty using a screwdriver and doing things around the house that he would like to do.  He has been evaluated by neurology and has no Parkinsonism.  He is now scheduled for the above procedure and hopes for a better quality of life.             Hospital Course:   Following surgery the patient was monitored overnight in the Neuro ICU where she remained medically stable.  Postoperative head CT and skull x-rays revealed proper positioning of intracranial leads with small hemorrhage which was stable on repeat imaging.   Following surgery the patient complained of mild incisional pain however was tolerating diet, voiding without a Newman, and ambulating safely.   Home medications were restarted  prior to discharge.  Patient was given 3 doses of IV antibiotic for wound prophylaxis and completed doses prior to discharge.  Patient was able to discharge home with family on postoperative day #1.  Patient will follow-up in 2 weeks for wound evaluation.  Discharge instructions were discussed with the patient who stated understanding.            Discharge Instructions and Follow-Up:   Discharge diet: Regular   Discharge activity: Do not do any bending, twisting, strenuous exercise, or heavy lifting (greater than 10 pounds) for 4-6 weeks. Be careful and ask for assistance when walking or going up and down stairs. Avoid any activities that could result in trauma to the surgical wound. Do not drive within 3 months of having your last seizure or while using narcotics or other sedating medications, such as sleep aids, muscle relaxants, etc.   Discharge follow-up: Back to OR on 10/13 for scheduled phase 2   Wound care: You should remove your dressings and bandages on post-operative day #2. You should then keep the wound undressed and open to air. You are allowed to take showers and get the wound wet starting on post-operative day #3 but you may not scrub or soak the wound or keep it submerged under water. If you do happen to get the wound wet, be sure to pat dry it rather than scrubbing it with a towel.             Discharge Disposition:   Discharged to home        Saw Gruber MD

## 2020-10-07 NOTE — PLAN OF CARE
AxOx4. L head incision JAVIER, CDI. Steady gait. Tolerating regular diet. Patient said tremor greatly improved as of this AM. IV removed prior to patient leaving. Discharge summary discussed with patient, discussed s/s of infection and follow up appointments. Discharged to front lobby with all belongings, friend picked up patient.

## 2020-10-08 ENCOUNTER — PATIENT OUTREACH (OUTPATIENT)
Dept: NEUROSURGERY | Facility: CLINIC | Age: 75
End: 2020-10-08

## 2020-10-08 NOTE — PROGRESS NOTES
[facility]: Post-Discharge Note  SITUATION                                                      Admission:    Admission Date: 10/06/20   Reason for Admission: Left side deep brain stimulator placement, phase I, placement of left side deep brain stimulator electrode, target left ventral intermediate nucleus of the thalamus, with microelectrode recording  Discharge:   Discharge Date: 10/07/20  Discharge Diagnosis: Tremor  Discharge Service: Neurosurgery  Discharge Plan: Routine postop follow up    BACKGROUND                                                      Neurosurgery Discharge Coordination Note     Attending physician: Dr. Mckeon  Discharge to: Home     Current status: Patient states he is doing well overall. Mild headache and tenderness at incision and pin sites. Taking Tylenol with little relief. Discussed trying 1-2 tablets oxycodone every 4-6 hours as needed if he needs something stronger. Pt will consider it. Denies redness, swelling, increased tenderness, drainage, incision opening or elevated temp. Reports Incision CDI without signs of infection.  Denies current bowel or bladder issues.    Discharge instructions and medications reviewed with patient. Also discussed preop instructions in detail  Follow up appointments/imaging/tests needed: DBS battery placement surgery 10/13/20 at 3:40 pm, arrive on unit 3C at 1:40 p.m .     RN triage/on call number given: [phone]/ [phone]        ASSESSMENT      Discharge Assessment  Patient reports symptoms are: Improved  Does the patient have all of their medications?: Yes  Does patient know what their new medications are for?: Yes  Does patient have a follow-up appointment scheduled?: Yes  Does patient have any other questions or concerns?: No    Post-op  Did the patient have surgery or a procedure: Yes  Incision: closed;healing  Bleeding: none  Fever: No  Chills: No  Redness: No  Warmth: No  Swelling: No  Incision site pain: Yes  Closure:  suture;dissolving  Eating & Drinking: eating and drinking without complaints/concerns  PO Intake: regular diet  Bowel Function: normal  Urinary Status: voiding without complaint/concerns        PLAN                                                      Outpatient Plan:  Routine postop follow up    Future Appointments   Date Time Provider Department Center   10/10/2020 11:30 AM [facility] COVID LAB [facility]   10/26/2020 11:45 AM Darin Mckeon MD [facility]           DEEDEE LAGUERRE RN

## 2020-10-08 NOTE — PROGRESS NOTES
[facility]: Post-Discharge Note  SITUATION                                                      Admission:    Admission Date: 10/06/20   Reason for Admission: Tremor  Discharge:   Discharge Date: 10/07/20  Discharge Diagnosis: Tremor    BACKGROUND                                                      Duane Faymoville is a 74 year old male who presents for  DBS placement, left on 10/6.   This is a 74 year old male with PMH significant for hypertension, dyslipidemia, and DM.  He began having postural tremors of the right arm about 10 years ago.  The tremors get worse with stress/anxiety.  He denies tremor anywhere else in his body.  He reports that he know has tremor in the left hand as well.  His tremor significantly affects his everyday life. He has difficulty writing and holding objects. He has difficulty using a screwdriver and doing things around the house that he would like to do.  He has been evaluated by neurology and has no Parkinsonism.  He is now scheduled for the above procedure and hopes for a better quality of life.            ASSESSMENT      Discharge Assessment  Patient reports symptoms are: Improved  Does the patient have all of their medications?: Yes  Does patient know what their new medications are for?: Yes  Does patient have a follow-up appointment scheduled?: Yes  Does patient have any other questions or concerns?: No    Post-op  Did the patient have surgery or a procedure: Yes  Incision: healing  Drainage: No  Bleeding: none  Fever: No  Chills: No  Redness: No  Warmth: No  Swelling: Yes  Incision site pain: Yes  Eating & Drinking: eating and drinking without complaints/concerns  PO Intake: regular diet  Bowel Function: normal  Urinary Status: voiding without complaint/concerns        PLAN                                                      Outpatient Plan:      Future Appointments   Date Time Provider Department Center   10/10/2020 11:30 AM [facility] COVID LAB JOEY DIAZ    10/26/2020 11:45 AM Darin Mckeon MD [facility]           Jerilyn Pacheco, CMA

## 2020-10-10 DIAGNOSIS — Z11.59 ENCOUNTER FOR SCREENING FOR OTHER VIRAL DISEASES: ICD-10-CM

## 2020-10-10 PROCEDURE — U0003 INFECTIOUS AGENT DETECTION BY NUCLEIC ACID (DNA OR RNA); SEVERE ACUTE RESPIRATORY SYNDROME CORONAVIRUS 2 (SARS-COV-2) (CORONAVIRUS DISEASE [COVID-19]), AMPLIFIED PROBE TECHNIQUE, MAKING USE OF HIGH THROUGHPUT TECHNOLOGIES AS DESCRIBED BY CMS-2020-01-R: HCPCS | Performed by: NEUROLOGICAL SURGERY

## 2020-10-11 LAB
SARS-COV-2 RNA SPEC QL NAA+PROBE: NOT DETECTED
SPECIMEN SOURCE: NORMAL

## 2020-10-12 ENCOUNTER — ANESTHESIA EVENT (OUTPATIENT)
Dept: SURGERY | Facility: CLINIC | Age: 75
End: 2020-10-12
Payer: MEDICARE

## 2020-10-12 ASSESSMENT — LIFESTYLE VARIABLES: TOBACCO_USE: 0

## 2020-10-12 NOTE — ANESTHESIA PREPROCEDURE EVALUATION
"Anesthesia Pre-Procedure Evaluation    Patient: Duane Faymoville   MRN:     [id number] Gender:   male   Age:    74 year old :      1945        Preoperative Diagnosis: Tremor [R25.1]   Procedure(s):  Deep brain stimulator placement, phase II, placement of deep brain stimulator generator/battery over the left chest wall        LABS:  CBC:   Lab Results   Component Value Date    WBC 8.8 10/07/2020    WBC 5.0 2020    HGB 14.4 10/07/2020    HGB 15.3 2020    HCT 44.4 10/07/2020    HCT 46.4 2020     10/07/2020     2020     BMP:   Lab Results   Component Value Date     10/07/2020     2020    POTASSIUM 3.9 10/07/2020    POTASSIUM 4.0 2020    CHLORIDE 107 10/07/2020    CHLORIDE 106 2020    CO2 25 10/07/2020    CO2 26 2020    BUN 17 10/07/2020    BUN 23 2020    CR 0.72 10/07/2020    CR 0.74 2020     (H) 10/07/2020     (H) 2020     COAGS:   Lab Results   Component Value Date    INR 0.99 2020     POC:   Lab Results   Component Value Date     (H) 10/07/2020     OTHER:   Lab Results   Component Value Date    A1C 6.4 (H) 2020    KRISTIN 8.6 10/07/2020    PHOS 2.9 10/07/2020    MAG 2.0 10/07/2020        Preop Vitals    BP Readings from Last 3 Encounters:   10/07/20 138/58   20 139/72   20 (!) 146/73    Pulse Readings from Last 3 Encounters:   10/07/20 57   20 60   20 60      Resp Readings from Last 3 Encounters:   10/07/20 16   20 14   20 16    SpO2 Readings from Last 3 Encounters:   10/07/20 94%   20 96%   20 97%      Temp Readings from Last 1 Encounters:   10/07/20 36.1  C (96.9  F) (Oral)    Ht Readings from Last 1 Encounters:   10/06/20 1.727 m (5' 8\")      Wt Readings from Last 1 Encounters:   10/06/20 87.2 kg (192 lb 3.9 oz)    Estimated body mass index is 29.23 kg/m  as calculated from the following:    Height as of 10/6/20: 1.727 m (5' 8\").    Weight " as of 10/6/20: 87.2 kg (192 lb 3.9 oz).     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, antepartum       Past Surgical History:   Procedure Laterality Date     COLONOSCOPY  06/01/2015     HERNIA REPAIR, UMBILICAL  10/01/2012     OPTICAL TRACKING SYSTEM INSERTION DEEP BRAIN STIMULATION Left 10/6/2020    Procedure: stealth assisted Left side deep brain stimulator placement, phase I, placement of left side deep brain stimulator electrode, target left ventral intermediate nucleus of the thalamus, with microelectrode recording;  Surgeon: Darin Mckeon MD;  Location: [facility] OR     ORTHOPEDIC SURGERY Left     ORIF left forearm after fracture     PROSTATE SURGERY  03/01/2015     VASCULAR SURGERY Left 04/01/2012    left leg varicose vein surgery     VASCULAR SURGERY Right 03/01/2012    right leg varicose vein surgery      No Known Allergies     Anesthesia Evaluation     . Pt has had prior anesthetic.     No history of anesthetic complications          ROS/MED HX    ENT/Pulmonary:  - neg pulmonary ROS    (-) tobacco use   Neurologic:     (+)other neuro tremor    Cardiovascular:     (+) Dyslipidemia, hypertension----. : . . . :. . No previous cardiac testing       METS/Exercise Tolerance: Comment: Uses stationary bike, walks, light weight training  Exercises daily >4 METS   Hematologic:  - neg hematologic  ROS      (-) history of blood clots and History of Transfusion   Musculoskeletal: Comment: Chronic low back pain        GI/Hepatic:  - neg GI/hepatic ROS        Renal/Genitourinary:  - ROS Renal section negative       Endo: Comment: Metformin; not on insulin    (+) type II DM .   (-) thyroid disease   Psychiatric:         Infectious Disease:   (+) Other Infectious Disease h/o Lyme disease      Malignancy:      - no malignancy   Other:    (+) no H/O Chronic Pain,                       PHYSICAL EXAM:   Mental Status/Neuro: A/A/O; Age Appropriate   Airway: Facies: Feasible  Mallampati: I  Mouth/Opening: Full  TM distance: > 6 cm  Neck ROM: Full   Respiratory: Auscultation: CTAB     Resp. Rate: Normal     Resp. Effort: Normal      CV: Rhythm: Regular  Rate: Age appropriate  Heart: Normal Sounds  Edema: None   Comments:      Dental: Normal Dentition                Assessment:   ASA SCORE: 2      Smoking Status:  Non-Smoker/Unknown   NPO Status: NPO Appropriate     Plan:   Anes. Type:  MAC   Pre-Medication: None   Induction:  IV (Standard)      Access/Monitoring: PIV        Postop Plan:   Postop Pain: Opioids  Postop Sedation/Airway: Not planned     PONV Management:   Adult Risk Factors:, Non-Smoker, Postop Opioids   Prevention: Ondansetron     CONSENT: Direct conversation   Plan and risks discussed with: Patient   Blood Products: Consent Deferred (Minimal Blood Loss)                  PAC Discussion and Assessment    ASA Classification: 2  Case is suitable for: [place]  Anesthetic techniques and relevant risks discussed: MAC with GA as backup  Invasive monitoring and risk discussed: No  Types:   Possibility and Risk of blood transfusion discussed: No  NPO instructions given:   Additional anesthetic preparation and risks discussed:   Needs early admission to pre-op area:   Other:     PAC Resident/NP Anesthesia Assessment:  Duane Faymoville is a 74 year old male scheduled for DBS placement, left on 10/6 under MAC with local and DBS generator placement left chest wall under general anesthesia 10/13 by Dr. Mckeon in treatment of tremor.  PAC referral for risk assessment and  optimization for anesthesia with comorbid conditions of hypertension, dyslipidemia, and NIDDM:    Pre-operative considerations:  1.  Cardiac:  Functional status- METS >4.  Pt is very active. He exercises daily with walking/stationary biking.  He pushes the .   Intermediate risk surgery with 0.4% (RCRI #) risk of major adverse cardiac event.   Hypertension, will hold enalapril for phase 2, general anesthesia.  Lipitor at bedtime.  2.  Pulm:  Airway feasible.  YONNY risk: Intermediate.  Never smoker.  3.  GI:  Risk of PONV score =  1.  If > 2, anti-emetic intervention recommended.  4.  Neuro: h/o tremor, right arm.  Left hand now as well.  No parkinsonism, no resting tremor.    VTE risk: 3%  (age, gender, FH of VTE)    Patient is optimized and is acceptable candidate for the proposed procedure.  No further diagnostic evaluation is needed.         **For further details of assessment, testing, and physical exam please see H and P completed on same date.          Helene Grullon PA-C, [facility]      Reviewed and Signed by PAC Mid-Level Provider/Resident  Mid-Level Provider/Resident: Helene Grullon  Date: 9/28/2020  Time:     Attending Anesthesiologist Anesthesia Assessment:        Anesthesiologist:   Date:   Time:   Pass/Fail:   Disposition:     PAC Pharmacist Assessment:        Pharmacist:   Date:   Time:    Faraz Lopez MD

## 2020-10-13 ENCOUNTER — HOSPITAL ENCOUNTER (OUTPATIENT)
Facility: CLINIC | Age: 75
Discharge: HOME | End: 2020-10-13
Attending: NEUROLOGICAL SURGERY | Admitting: NEUROLOGICAL SURGERY
Payer: MEDICARE

## 2020-10-13 ENCOUNTER — ANESTHESIA (OUTPATIENT)
Dept: SURGERY | Facility: CLINIC | Age: 75
End: 2020-10-13
Payer: MEDICARE

## 2020-10-13 VITALS
HEART RATE: 69 BPM | HEIGHT: 68 IN | TEMPERATURE: 98.5 F | SYSTOLIC BLOOD PRESSURE: 135 MMHG | DIASTOLIC BLOOD PRESSURE: 76 MMHG | RESPIRATION RATE: 16 BRPM | BODY MASS INDEX: 29.17 KG/M2 | OXYGEN SATURATION: 95 % | WEIGHT: 192.46 LBS

## 2020-10-13 DIAGNOSIS — R25.1 TREMOR: Primary | ICD-10-CM

## 2020-10-13 LAB
GLUCOSE BLDC GLUCOMTR-MCNC: 126 MG/DL (ref 70–99)
GLUCOSE BLDC GLUCOMTR-MCNC: 149 MG/DL (ref 70–99)

## 2020-10-13 PROCEDURE — 250N000003 HC SEVOFLURANE, EA 15 MIN: Performed by: NEUROLOGICAL SURGERY

## 2020-10-13 PROCEDURE — 258N000003 HC RX IP 258 OP 636: Performed by: NURSE ANESTHETIST, CERTIFIED REGISTERED

## 2020-10-13 PROCEDURE — 272N000002 HC OR SUPPLY OTHER OPNP: Performed by: NEUROLOGICAL SURGERY

## 2020-10-13 PROCEDURE — 360N000023 HC SURGERY LEVEL 3 EA 15 ADDTL MIN UMMC: Performed by: NEUROLOGICAL SURGERY

## 2020-10-13 PROCEDURE — 250N000011 HC RX IP 250 OP 636: Performed by: NEUROLOGICAL SURGERY

## 2020-10-13 PROCEDURE — 999N001017 HC STATISTIC GLUCOSE BY METER IP

## 2020-10-13 PROCEDURE — 370N000002 HC ANESTHESIA TECHNICAL FEE, EACH ADDTL 15 MIN: Performed by: NEUROLOGICAL SURGERY

## 2020-10-13 PROCEDURE — 761N000003 HC RECOVERY PHASE 1 LEVEL 2 FIRST HR: Performed by: NEUROLOGICAL SURGERY

## 2020-10-13 PROCEDURE — C1767 GENERATOR, NEURO NON-RECHARG: HCPCS | Performed by: NEUROLOGICAL SURGERY

## 2020-10-13 PROCEDURE — 999N000139 HC STATISTIC PRE-PROCEDURE ASSESSMENT II: Performed by: NEUROLOGICAL SURGERY

## 2020-10-13 PROCEDURE — C1883 ADAPT/EXT, PACING/NEURO LEAD: HCPCS | Performed by: NEUROLOGICAL SURGERY

## 2020-10-13 PROCEDURE — 250N000011 HC RX IP 250 OP 636: Performed by: NURSE ANESTHETIST, CERTIFIED REGISTERED

## 2020-10-13 PROCEDURE — 250N000009 HC RX 250: Performed by: NEUROLOGICAL SURGERY

## 2020-10-13 PROCEDURE — 250N000009 HC RX 250: Performed by: NURSE ANESTHETIST, CERTIFIED REGISTERED

## 2020-10-13 PROCEDURE — 360N000022 HC SURGERY LEVEL 3 1ST 30 MIN - UMMC: Performed by: NEUROLOGICAL SURGERY

## 2020-10-13 PROCEDURE — 370N000001 HC ANESTHESIA TECHNICAL FEE, 1ST 30 MIN: Performed by: NEUROLOGICAL SURGERY

## 2020-10-13 PROCEDURE — 61885 INSRT/REDO NEUROSTIM 1 ARRAY: CPT | Mod: 58 | Performed by: NEUROLOGICAL SURGERY

## 2020-10-13 PROCEDURE — 272N000001 HC OR GENERAL SUPPLY STERILE: Performed by: NEUROLOGICAL SURGERY

## 2020-10-13 PROCEDURE — 761N000007 HC RECOVERY PHASE 2 EACH 15 MINS: Performed by: NEUROLOGICAL SURGERY

## 2020-10-13 DEVICE — GENERATOR DBS SYSTEM INFINITY 5 IPG 6660ANS: Type: IMPLANTABLE DEVICE | Site: CHEST | Status: FUNCTIONAL

## 2020-10-13 DEVICE — LEAD EXTENSION KIT 60CM DBS 37086-60: Type: IMPLANTABLE DEVICE | Site: CHEST | Status: FUNCTIONAL

## 2020-10-13 DEVICE — LEAD EXTENSION W/EXTEND TECHNOLOGY 50CM 6371ANS: Type: IMPLANTABLE DEVICE | Site: CHEST | Status: FUNCTIONAL

## 2020-10-13 RX ORDER — SODIUM CHLORIDE, SODIUM LACTATE, POTASSIUM CHLORIDE, CALCIUM CHLORIDE 600; 310; 30; 20 MG/100ML; MG/100ML; MG/100ML; MG/100ML
INJECTION, SOLUTION INTRAVENOUS CONTINUOUS
Status: DISCONTINUED | OUTPATIENT
Start: 2020-10-13 | End: 2020-10-13 | Stop reason: HOSPADM

## 2020-10-13 RX ORDER — ONDANSETRON 2 MG/ML
4 INJECTION INTRAMUSCULAR; INTRAVENOUS EVERY 30 MIN PRN
Status: DISCONTINUED | OUTPATIENT
Start: 2020-10-13 | End: 2020-10-13 | Stop reason: HOSPADM

## 2020-10-13 RX ORDER — FENTANYL CITRATE 50 UG/ML
25-50 INJECTION, SOLUTION INTRAMUSCULAR; INTRAVENOUS
Status: DISCONTINUED | OUTPATIENT
Start: 2020-10-13 | End: 2020-10-13 | Stop reason: HOSPADM

## 2020-10-13 RX ORDER — ONDANSETRON 4 MG/1
4 TABLET, ORALLY DISINTEGRATING ORAL EVERY 30 MIN PRN
Status: DISCONTINUED | OUTPATIENT
Start: 2020-10-13 | End: 2020-10-13 | Stop reason: HOSPADM

## 2020-10-13 RX ORDER — OXYCODONE HYDROCHLORIDE 5 MG/1
5-10 TABLET ORAL EVERY 4 HOURS PRN
Qty: 15 TABLET | Refills: 0 | Status: SHIPPED | OUTPATIENT
Start: 2020-10-13 | End: 2020-12-09

## 2020-10-13 RX ORDER — DEXAMETHASONE SODIUM PHOSPHATE 4 MG/ML
INJECTION, SOLUTION INTRA-ARTICULAR; INTRALESIONAL; INTRAMUSCULAR; INTRAVENOUS; SOFT TISSUE PRN
Status: DISCONTINUED | OUTPATIENT
Start: 2020-10-13 | End: 2020-10-13

## 2020-10-13 RX ORDER — HYDROMORPHONE HYDROCHLORIDE 1 MG/ML
.3-.5 INJECTION, SOLUTION INTRAMUSCULAR; INTRAVENOUS; SUBCUTANEOUS EVERY 10 MIN PRN
Status: DISCONTINUED | OUTPATIENT
Start: 2020-10-13 | End: 2020-10-13 | Stop reason: HOSPADM

## 2020-10-13 RX ORDER — LIDOCAINE HYDROCHLORIDE 20 MG/ML
INJECTION, SOLUTION INFILTRATION; PERINEURAL PRN
Status: DISCONTINUED | OUTPATIENT
Start: 2020-10-13 | End: 2020-10-13

## 2020-10-13 RX ORDER — NALOXONE HYDROCHLORIDE 0.4 MG/ML
.1-.4 INJECTION, SOLUTION INTRAMUSCULAR; INTRAVENOUS; SUBCUTANEOUS
Status: DISCONTINUED | OUTPATIENT
Start: 2020-10-13 | End: 2020-10-13 | Stop reason: HOSPADM

## 2020-10-13 RX ORDER — SODIUM CHLORIDE, SODIUM LACTATE, POTASSIUM CHLORIDE, CALCIUM CHLORIDE 600; 310; 30; 20 MG/100ML; MG/100ML; MG/100ML; MG/100ML
INJECTION, SOLUTION INTRAVENOUS CONTINUOUS PRN
Status: DISCONTINUED | OUTPATIENT
Start: 2020-10-13 | End: 2020-10-13

## 2020-10-13 RX ORDER — PROPOFOL 10 MG/ML
INJECTION, EMULSION INTRAVENOUS PRN
Status: DISCONTINUED | OUTPATIENT
Start: 2020-10-13 | End: 2020-10-13

## 2020-10-13 RX ORDER — ONDANSETRON 2 MG/ML
INJECTION INTRAMUSCULAR; INTRAVENOUS PRN
Status: DISCONTINUED | OUTPATIENT
Start: 2020-10-13 | End: 2020-10-13

## 2020-10-13 RX ORDER — CEFAZOLIN SODIUM 2 G/100ML
2 INJECTION, SOLUTION INTRAVENOUS
Status: COMPLETED | OUTPATIENT
Start: 2020-10-13 | End: 2020-10-13

## 2020-10-13 RX ORDER — LIDOCAINE 40 MG/G
CREAM TOPICAL
Status: DISCONTINUED | OUTPATIENT
Start: 2020-10-13 | End: 2020-10-13 | Stop reason: HOSPADM

## 2020-10-13 RX ORDER — CEFAZOLIN SODIUM 1 G/3ML
1 INJECTION, POWDER, FOR SOLUTION INTRAMUSCULAR; INTRAVENOUS SEE ADMIN INSTRUCTIONS
Status: DISCONTINUED | OUTPATIENT
Start: 2020-10-13 | End: 2020-10-13 | Stop reason: HOSPADM

## 2020-10-13 RX ORDER — FENTANYL CITRATE 50 UG/ML
INJECTION, SOLUTION INTRAMUSCULAR; INTRAVENOUS PRN
Status: DISCONTINUED | OUTPATIENT
Start: 2020-10-13 | End: 2020-10-13

## 2020-10-13 RX ADMIN — DEXAMETHASONE SODIUM PHOSPHATE 4 MG: 4 INJECTION, SOLUTION INTRA-ARTICULAR; INTRALESIONAL; INTRAMUSCULAR; INTRAVENOUS; SOFT TISSUE at 15:45

## 2020-10-13 RX ADMIN — LIDOCAINE HYDROCHLORIDE 100 MG: 20 INJECTION, SOLUTION INFILTRATION; PERINEURAL at 15:32

## 2020-10-13 RX ADMIN — ROCURONIUM BROMIDE 50 MG: 10 INJECTION INTRAVENOUS at 15:34

## 2020-10-13 RX ADMIN — CEFAZOLIN 2 G: 10 INJECTION, POWDER, FOR SOLUTION INTRAVENOUS at 15:42

## 2020-10-13 RX ADMIN — FENTANYL CITRATE 50 MCG: 50 INJECTION, SOLUTION INTRAMUSCULAR; INTRAVENOUS at 16:10

## 2020-10-13 RX ADMIN — SUGAMMADEX 200 MG: 100 INJECTION, SOLUTION INTRAVENOUS at 16:55

## 2020-10-13 RX ADMIN — ONDANSETRON 4 MG: 2 INJECTION INTRAMUSCULAR; INTRAVENOUS at 15:45

## 2020-10-13 RX ADMIN — FENTANYL CITRATE 50 MCG: 50 INJECTION, SOLUTION INTRAMUSCULAR; INTRAVENOUS at 16:54

## 2020-10-13 RX ADMIN — SODIUM CHLORIDE, POTASSIUM CHLORIDE, SODIUM LACTATE AND CALCIUM CHLORIDE: 600; 310; 30; 20 INJECTION, SOLUTION INTRAVENOUS at 15:24

## 2020-10-13 RX ADMIN — FENTANYL CITRATE 100 MCG: 50 INJECTION, SOLUTION INTRAMUSCULAR; INTRAVENOUS at 15:33

## 2020-10-13 RX ADMIN — PROPOFOL 150 MG: 10 INJECTION, EMULSION INTRAVENOUS at 15:33

## 2020-10-13 ASSESSMENT — MIFFLIN-ST. JEOR: SCORE: 1592.5

## 2020-10-13 NOTE — ANESTHESIA POSTPROCEDURE EVALUATION
Anesthesia POST Procedure Evaluation    Patient: Duane Faymoville   MRN:     [id number] Gender:   male   Age:    75 year old :      1945        Preoperative Diagnosis: Tremor [R25.1]   Procedure(s):  Deep brain stimulator placement, phase II, placement of deep brain stimulator generator/battery over the left chest wall   Postop Comments: No value filed.     Anesthesia Type: MAC, General       Disposition: Outpatient   Postop Pain Control: Uneventful            Sign Out: Well controlled pain   PONV: No   Neuro/Psych: Uneventful            Sign Out: Acceptable/Baseline neuro status   Airway/Respiratory: Uneventful            Sign Out: Acceptable/Baseline resp. status   CV/Hemodynamics: Uneventful            Sign Out: Acceptable CV status   Other NRE: NONE   DID A NON-ROUTINE EVENT OCCUR? No         Last Anesthesia Record Vitals:  CRNA VITALS  10/13/2020 1647 - 10/13/2020 1747      10/13/2020             Pulse:  79    Ht Rate:  79    SpO2:  96 %    Resp Rate (observed):  (!) 1          Last PACU Vitals:  Vitals Value Taken Time   /81 10/13/20 1740   Temp 37.2  C (98.96  F) 10/13/20 1746   Pulse 70 10/13/20 1746   Resp 15 10/13/20 1730   SpO2 96 % 10/13/20 174   Temp src     NIBP     Pulse     SpO2     Resp     Temp     Ht Rate     Temp 2     Vitals shown include unvalidated device data.      Electronically Signed By: Fahad Leonard MD, 2020, 5:47 PM

## 2020-10-13 NOTE — H&P
NEUROSURGERY    HISTORY AND PHYSICAL EXAM UPDATE    Chief Complaint   Patient presents with     DBS SURGERY       ONGOING DBS SURGERY, ESSENTIAL TREMOR, S/P LEFT SIDE DEEP BRAIN STIMULATOR PLACEMENT, PHASE I, PLACEMENT OF LEFT SIDE DEEP BRAIN STIMULATOR ELECTRODE, TARGET LEFT VENTRAL INTERMEDIATE NUCLEUS OF THE THALAMUS WITH MICROELECTRODE RECORDING ON 10/06/2020       HISTORY OF PRESENT ILLNESS  Mr. Duane Faymoville returns today for his ongoing DBS surgery.  He is s/p left side deep brain stimulator placement, phase I, placement of left side deep brain stimulator electrode, target left ventral intermediate nucleus of the thalamus, with microelectrode recording on 10/06/2020.  Patient tolerated the procedure well and there were no complications.  His postoperative CT head was without any concerning findings and he was discharged to home on POD#1.  Patient reports that he is doing well.  He has minimal headache.  He denies any fevers, chills, nausea, vomiting, dizziness, weakness, numbness or seizure like activity.  His incision is clean/dry and intact with no redness, no drainage and no fluid collection.  He is here for his phase II surgery.    Briefly, patient is a 75 year old right handed male who is diagnosed with essential tremor.  In his family, only one of his sons have tremor in the hands that began at age 30.  In terms of the patient, he began having postural action tremors of his right arm about 10 years ago.  The tremor was exacerbated by fatigue and stress/anxiety.  He does not have tremor in his legs and he does not have head tremor.  He is not sure if alcohol alleviates his tremor as he does not drink much.  His tremor has gotten worse as it now involves both hands.  However, it is worse on the right side or at least more noticeable on the right side.  He does have some coping strategies such as holding his arm to help him with writing.  His writing is severely affected.  He cannot hold objects and  tools.  It is also affecting other ADLs.  The goal of the DBS surgery is to be able to write and be able to perform household book keeping.  He wants better quality of life overall.  He would like both sides treated but he would be more happy if his right arm was tremor free.  His case was discussed at the Movement Disorder Consensus Group Meeting and he was considered to be a good candidate.  Recommendations were wait and see strategy, left side Vim, Abbot device.    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, antepartum      Past Surgical History:   Procedure Laterality Date     COLONOSCOPY  06/01/2015     HERNIA REPAIR, UMBILICAL  10/01/2012     OPTICAL TRACKING SYSTEM INSERTION DEEP BRAIN STIMULATION Left 10/6/2020    Procedure: stealth assisted Left side deep brain stimulator placement, phase I, placement of left side deep brain stimulator electrode, target left ventral intermediate nucleus of the thalamus, with microelectrode recording;  Surgeon: Darin Mckeon MD;  Location: [facility] OR     ORTHOPEDIC SURGERY Left     ORIF left forearm after fracture     PROSTATE SURGERY  03/01/2015     VASCULAR SURGERY Left 04/01/2012    left leg varicose vein surgery     VASCULAR SURGERY Right 03/01/2012    right leg varicose vein surgery     Social History     Socioeconomic History     Marital status:      Spouse name: Not on file     Number of children: Not on file     Years of education: Not on file     Highest education level: Not on file   Occupational History     Not on file   Social Needs     Financial resource strain: Not on file     Food insecurity      Worry: Not on file     Inability: Not on file     Transportation needs     Medical: Not on file     Non-medical: Not on file   Tobacco Use     Smoking status: Never Smoker     Smokeless tobacco: Never Used   Substance and Sexual Activity     Alcohol use: Yes     Comment: very little     Drug use: Not Currently     Sexual activity: Not on file   Lifestyle     Physical activity     Days per week: Not on file     Minutes per session: Not on file     Stress: Not on file   Relationships     Social connections     Talks on phone: Not on file     Gets together: Not on file     Attends Judaism service: Not on file     Active member of club or organization: Not on file     Attends meetings of clubs or organizations: Not on file     Relationship status: Not on file     Intimate partner violence     Fear of current or ex partner: Not on file     Emotionally abused: Not on file     Physically abused: Not on file     Forced sexual activity: Not on file   Other Topics Concern     Parent/sibling w/ CABG, MI or angioplasty before 65F 55M? Not Asked   Social History Narrative     Not on file     Family History   Problem Relation Age of Onset     Dementia Mother      Cancer Sister       No Known Allergies    Current Outpatient Medications   Medication     atorvastatin (LIPITOR) 10 MG tablet     enalapril (VASOTEC) 2.5 MG tablet     metFORMIN (GLUCOPHAGE) 500 MG tablet      No current facility-administered medications for this visit.        REVIEW OF SYSTEMS - also per HPI.  General: Negative for chills/sweats/fever, difficulty sleeping, headache, recent fatigue, or weight gain/loss.  Eyes: Negative for blurred vision, crossed eyes, double vision, recent eye infections, vision flashes, or vision halos.  Ears/Nose/Mouth/Throat: Negative for bleeding gums, difficulty swallowing, earache, ear discharge, hearing loss, hoarseness, nosebleeds, tinnitus, or sinus problems.  Respiratory:Negative for chronic cough, coughing blood, night  "sweats, shortness of breath, Tuberculosis, or wheezing.  Cardiovascular: Negative for chest pain, dyspnea at night, heart murmur, palpitations, pacemaker, pacemaker, poor circulation, swollen legs/feet, or varicose veins.  Gastrointestinal: POSITIVE for constipation and peptic ulcer disease.  Negative for melena, hematochezia, chronic diarrhea, heartburn, Hepatitis A/B/C, Liver Disease, nausea, or vomiting.   Genitourinary: POSITIVE for erectile dysfunction and prostate problems s/p prostectomy.  Negative for Urinary retention, genital discharge, urinary incontinence, urgency, or UTI.   Neurological: POSITIVE for tremors.  Negative for syncope, headaches, numbness of arms/legs, tingling in hands/arms/legs, memory problems, or seizures.  Psychological: Negative for anxiety, depression, panic attacks, or restlessness.  Skin: Negative for chronic skin itching, color changes in hand/feet when cold, poor scarring, non-healing ulcers, skin rashes/hives, unusual moles.  Musculoskeletal: Negative for arthritis, joint swelling in hands/wrists/hips/knees/joints, muscle tenderness in arms/legs, or osteoporosis.  Endocrine: Negative for excessive thirst/hunger, intolerance for warm rooms, loss of libido, multiple broken bones, rapid weight gain/loss, galactorrhea, or thyroid issues.  Hematologic/Lymphatic: Negative for easy skin bruising, significant fatigue, prolonged bleeding, tender glands/lymph nodes.  Allergies: Negative for asthma or hay fever.      PHYSICAL EXAM  BP (!) 175/89   Pulse 71   Temp 98.3  F (36.8  C) (Oral)   Resp 17   Ht 1.727 m (5' 8\")   Wt 87.3 kg (192 lb 7.4 oz)   SpO2 97%   BMI 29.26 kg/m      General: Awake, alert, oriented. Well nourished, well developed, no acute distress.  HEENT: Head normocephalic, atraumatic. No carotid bruit. Neck supple. Good range of motion. No palpable thyroid mass.  Heart: Regular rhythm and rate. No murmurs.  Lungs: Clear to auscultation and percussion bilaterally. No " rhonchi, rales or wheeze.  Abdomen: Soft, non-tender, non-distended. No hepatosplenomegaly.  Extremity: Warm with no clubbing or cyanosis. No lower extremity edema.  Left frontal incision is healing well.  No redness.  No drainage.  No fluid collection.    Neurological:  Awake, alert and oriented to date, time, place and person. Speech fluent.   Pupils equal, round, reactive to light.  Extraocular movement intact.  Visual fields are full on confrontation.  Hearing is grossly normal to finger rub.   Facial sensation intact.  Face symmetric.  Tongue midline.  Uvula elevates equally.    Motor: full strength throughout.  Sensation: intact to light touch and pinpoint.  Deep tendon reflexes: 1+ throughout. Negative for clonus. Negative for Rodríguez's sign. No dysmetria.  Some tremor that becomes more noticeable when he is writing.  Minimal resting tremor.      ASSESSMENT   75 year old right handed male with essential tremor.  S/p left side deep brain stimulator placement, phase I, placement of left side deep brain stimulator electrode, target left ventral intermediate nucleus of the thalamus, with microelectrode recording on 10/06/2020     Mr. Bang has done well with his phase I surgery.  He has had some lesion effect but his handwriting is returning to his baseline.  He has no complaints.  His incision is healing well.    We discussed the phase II of DBS surgery, placement of the DBS generator/battery over the left chest wall under general anesthesia.  Since he is a unilateral candidate or wait and see strategy candidate, he will undergo another evaluation/discussion prior to proceeding to the right side implantation.      Risks, benefits, alternative therapies were discussed with the patient, including but not limited to infection and bleeding (intracranial), injury to the brain, stroke, death, hardware failure and possible need for more surgeries.  Surgical procedure was discussed in detail.  All questions were  answered, and he expressed understanding.       PLAN:  1.  Deep brain stimulator placement, phase II, placement of deep brain stimulator generator/battery over the left chest wall

## 2020-10-13 NOTE — OP NOTE
PATIENT NAME: FAYMOVILLE, DUANE  YOB: 1945  MRN:   [id number]  ACCOUNT:  [id number]      DATE OF PROCEDURE:  10/13/2020    PREOPERATIVE DIAGNOSIS:   1.  Essential tremor  2.  Right side tremor worse than left side tremor  3.  S/p left side deep brain stimulator placement, phase I, placement of left side deep brain stimulator electrode, target left ventral intermediate nucleus of the thalamus, with microelectrode recording on 10/06/2020    POSTOPERATIVE DIAGNOSIS:    1.  Essential tremor  2.  Right side tremor worse than left side tremor  3.  S/p left side deep brain stimulator placement, phase I, placement of left side deep brain stimulator electrode, target left ventral intermediate nucleus of the thalamus, with microelectrode recording on 10/06/2020    PROCEDURES PERFORMED  1.  Deep brain stimulator placement, phase II, placement of new deep brain stimulator generator/battery over the left chest wall  2.  Placement of one extension wire and connection of the left side deep brain stimulator electrode, one electrode array, to the new generator/battery  3.  Electrical analysis of the deep brain stimulator system    ATTENDING SURGEON:  Darin Mckeon MD, PhD    RESIDENT SURGEON:  Cesario Christian MD    ANESTHESIA:  General anesthesia.    ESTIMATED BLOOD LOSS:  5 mL.    COMPLICATIONS:  None.    IMPLANT:   1.  Abbott DBS extension lead, REF [id number], S/N [id number]  2.  Abbott Infinity 5 DBS generator/battery, REF [id number], S/N [id number]    FINDINGS:  All impedance values were within normal.  No problems were found.    INDICATIONS FOR PROCEDURE:  Mr. Duane Faymoville returns today for his ongoing DBS surgery.  He is s/p left side deep brain stimulator placement, phase I, placement of left side deep brain stimulator electrode, target left ventral intermediate nucleus of the thalamus, with microelectrode recording on 10/06/2020.  Patient tolerated the procedure well and there were no complications.  His  postoperative CT head was without any concerning findings and he was discharged to home on POD#1.  Patient reports that he is doing well.  He has minimal headache.  He denies any fevers, chills, nausea, vomiting, dizziness, weakness, numbness or seizure like activity.  His incision is clean/dry and intact with no redness, no drainage and no fluid collection.  He is here for his phase II surgery.  Briefly, patient is a 75 year old right handed male who is diagnosed with essential tremor.  In his family, only one of his sons have tremor in the hands that began at age 30.  In terms of the patient, he began having postural action tremors of his right arm about 10 years ago.  The tremor was exacerbated by fatigue and stress/anxiety.  He does not have tremor in his legs and he does not have head tremor.  He is not sure if alcohol alleviates his tremor as he does not drink much.  His tremor has gotten worse as it now involves both hands.  However, it is worse on the right side or at least more noticeable on the right side.  He does have some coping strategies such as holding his arm to help him with writing.  His writing is severely affected.  He cannot hold objects and tools.  It is also affecting other ADLs.  The goal of the DBS surgery is to be able to write and be able to perform household book keeping.  He wants better quality of life overall.  He would like both sides treated but he would be more happy if his right arm was tremor free.  His case was discussed at the Movement Disorder Consensus Group Meeting and he was considered to be a good candidate.  Recommendations were wait and see strategy, left side Vim, Abbot device.  We discussed the phase II of DBS surgery, placement of the DBS generator/battery over the left chest wall under general anesthesia.  Since he is a unilateral candidate or wait and see strategy candidate, he will undergo another evaluation/discussion prior to proceeding to the right side  implantation.  Risks, benefits, alternative therapies were discussed with the patient, including but not limited to infection and bleeding (intracranial), injury to the brain, stroke, death, hardware failure and possible need for more surgeries.  Surgical procedure was discussed in detail.  All questions were answered, and he expressed understanding.     DESCRIPTION OF PROCEDURE: The patient was brought to the operating room and was laid supine on the operating table.  The patient was identified by me and with placement of endotracheal tube, general anesthesia was obtained.  All pressure points were appropriately padded.  His head was turned to the right side, thus exposing the left parietal area of the head.  The previously implanted connector portion of the stimulating electrode from the left side could be palpated on the left side of the head.  A small area of hair was removed over this palpable connector using a surgical hair clipper.  His hair over the left chest wall was also removed using a surgical hair clipper.  Then the left side of the head, neck, and chest area was prepped and draped in normal sterile fashion.  Local anesthetic was injected in the areas of intended incision at the left scalp and left chest wall as well as along the path of the intended tunneling.  A total of 17 mL of 1% lidocaine with epinephrine mixed with 0.25% Marcaine plain in a 50:50 combination was used.  A timeout was performed confirming the patient's identity, procedure to be performed, site and side of surgery and administration of preoperative prophylactic antibiotics.    Attention was turned to the head.  A #10 blade scalpel was used to make a 2 cm skin incision at the distal end of the connector that was palpated in his scalp.  Hemostasis was achieved with bipolar cautery.  The incision was carried down to the pericranium and buried connector was found.  The mosquito was used to hold the protective cover, and we gently  pulled out the connector.  This was found to be fully intact.  At that point, a pocket was made using a Milagros clamp down toward behind the ear into the nuchal line.  This was in preparation for tunneling of the extension wire.        At that point, attention was turned to the left chest wall area.  A #10 blade scalpel was used to make a 6 cm incision on the left chest wall.  A #10 blade scalpel was used to finish dissection down to the proper plane.  We found a nice dissecting plane superficial to the pectoralis muscle.  A combination of blunt dissection as well as the monopolar cautery was used to establish a subcutaneous pocket about 3 fingerbreadths wide and deep enough to encompass the full length of the fingers.  Hemostasis was achieved with monopolar cautery.  The pocket was copiously irrigated and sponge was placed in the pocket.      At this point, a tunneler was brought into the field.  We tunnel a passage from the head incision to the chest wall incision.  Care was taken to stay superficial and the path of the tunneler was confirmed to be over the clavicle.  The tunneler ware removed, leaving behind a plastic sheath.  One extension wire was passed from the head incision to the chest incision.  Subsequently, the plastic sheath was pulled out from the chest incision, leaving behind the tunneled extension wire.     We then proceeded to make the connection between the left intracranial electrode and the extension wire.  The protective covering was removed from the connector portion of the stimulating electrode.  The contacts were inspected to be clean and without damage.  Then, the stimulating electrode was inserted into the extension wire connection.  The connection was secured with screwdriver.  Then, the extension wire was gently pulled from the chest incision and the excess wire length towards the head was also buried superiorly until the connector was within the incision.  Then, the connector was buried  further superiorly in the incision until it was no longer directly under the incision.    At this time, a new Infinity 5 generator/battery was brought onto the field.  The extension wire was then cleaned at its contacts and inserted into the generator/battery portal.  The extension wire for the left sided implant was placed into the anterior connector portal.  A plug was placed in the posterior connector portal.  These were secured with a screwdriver.  Therefore, one electrode array was connected to the new generator/battery.  The previously placed sponge within the pocket was removed and hemostasis was confirmed.  Then, the new generator/battery with the excess wires being placed posterior to the generator/battery was placed within the left chest wall pocket that was created previously.  The entire apparatus fit into the pocket comfortably.  The generator/battery was anchored to the pocket using two 2-0 Ethibond sutures.    The system was tested electrically.  All the impedances of the left side stimulating electrode was within normal.  No problems were found with the system.    With the satisfactory placement of the new system, we began closing the wound.  The left chest incision was closed in the following manner.  The deep pocket was reapproximated using 3-0 Vicryl sutures in an inverted interrupted fashion.  The deep subcutaneous fat layer was reapproximated using 3-0 Vicryl sutures in an inverted interrupted fashion.  The dermal layer was reapproximated using 3-0 Vicryl sutures in an inverted interrupted fashion.  The skin was reapproximated using 4-0 Monocryl suture in a subcuticular fashion.  The left parietal incision was irrigated and dried.  Meticulous hemostasis was obtained.  It was then closed in the following manner.  The galea was reapproximated over the implanted hardware using 3-0 Vicryl sutures in an inverted interrupted fashion.  This provided a protective layer.  The skin was then reapproximated  using 4-0 Monocryl suture in a running fashion.  All the wounds were cleaned and dried.  ChoraPrep was used to clean the incisions again.  Then, Exofin skin glue was applied.    At the end of the case, all counts including needle, sponge and instrument counts were correct x 2.  There were no complications.  Patient was extubated and taken to the recovery room in a stable condition.    Attending neurosurgeon, Ed Mckeon MD, PhD, was scrubbed in and present for the entire procedure.      ED MCKEON MD, PHD    As prepared by ANA LILIA ALONZO MD      NEUROSURGERY ATTENDING ATTESTATION:  IEd M.D., Ph.D., Neurosurgery Attending, was present and scrubbed for the entire case and performed the key and critical portions of the case.

## 2020-10-13 NOTE — BRIEF OP NOTE
[facility]     Brief Operative Note    Pre-operative diagnosis: Tremor [R25.1]  Post-operative diagnosis Same as pre-operative diagnosis    Procedure: Procedure(s):  Deep brain stimulator placement, phase II, placement of deep brain stimulator generator/battery over the left chest wall  Surgeon: Surgeon(s) and Role:     * Darin Mckeon MD - Primary     * Cesario Christian MD - Resident - Assisting  Anesthesia: General   Estimated blood loss: 5 mL  Drains: None  Specimens: * No specimens in log *  Findings:   All impedances normal; therapy off.  Complications: None.  Implants:   Implant Name Type Inv. Item Serial No.  Lot No. LRB No. Used Action   LEAD EXTENSION KIT 60CM DBS 65350-87 Leads LEAD EXTENSION KIT 60CM DBS 99647-47 [id number] [employer]  Left 1 Implanted   LEAD EXTENSION W/EXTEND TECHNOLOGY 50CM 6371ANS Leads LEAD EXTENSION W/EXTEND TECHNOLOGY 50CM 6371ANS [id number] [employer]  Left 1 Implanted         Skin: monocryl

## 2020-10-13 NOTE — ANESTHESIA CARE TRANSFER NOTE
Patient: Duane Faymoville    Procedure(s):  Deep brain stimulator placement, phase II, placement of deep brain stimulator generator/battery over the left chest wall    Diagnosis: Tremor [R25.1]  Diagnosis Additional Information: No value filed.    Anesthesia Type:   MAC     Note:  Airway :Nasal Cannula  Patient transferred to:PACU  Handoff Report: Identifed the Patient, Identified the Reponsible Provider, Reviewed the pertinent medical history, Discussed the surgical course, Reviewed Intra-OP anesthesia mangement and issues during anesthesia, Set expectations for post-procedure period and Allowed opportunity for questions and acknowledgement of understanding      Vitals: (Last set prior to Anesthesia Care Transfer)    CRNA VITALS  10/13/2020 1647 - 10/13/2020 1721      10/13/2020             Pulse:  79    Ht Rate:  79    SpO2:  96 %    Resp Rate (observed):  (!) 1                Electronically Signed By: Charles Patrick Schlatter, APRN CRNA  October 13, 2020  5:21 PM

## 2020-10-13 NOTE — DISCHARGE INSTRUCTIONS
[facility]  Same-Day Surgery   Adult Discharge Orders & Instructions     For 24 hours after surgery    1. Get plenty of rest.  A responsible adult must stay with you for at least 24 hours after you leave the hospital.   2. Do not drive or use heavy equipment.  If you have weakness or tingling, don't drive or use heavy equipment until this feeling goes away.  3. Do not drink alcohol.  4. Avoid strenuous or risky activities.  Ask for help when climbing stairs.   5. You may feel lightheaded.  IF so, sit for a few minutes before standing.  Have someone help you get up.   6. If you have nausea (feel sick to your stomach): Drink only clear liquids such as apple juice, ginger ale, broth or 7-Up.  Rest may also help.  Be sure to drink enough fluids.  Move to a regular diet as you feel able.  7. You may have a slight fever. Call the doctor if your fever is over 100 F (37.7 C) (taken under the tongue) or lasts longer than 24 hours.  8. You may have a dry mouth, a sore throat, muscle aches or trouble sleeping.  These should go away after 24 hours.  9. Do not make important or legal decisions.   Call your doctor for any of the followin.  Signs of infection (fever, growing tenderness at the surgery site, a large amount of drainage or bleeding, severe pain, foul-smelling drainage, redness, swelling).    2. It has been over 8 to 10 hours since surgery and you are still not able to urinate (pass water).    3.  Headache for over 24 hours.    To contact Dr. Mckeon, call [phone] [CLINIC]  or:      [phone] and ask for the resident on call for Neurosurgery (answered 24 hours a day)      Emergency Department:    [facility]: [phone]       (TTY for hearing impaired: [phone])    [facility]: [phone]       (TTY for hearing impaired: [phone])

## 2020-10-14 ENCOUNTER — TELEPHONE (OUTPATIENT)
Dept: NEUROSURGERY | Facility: CLINIC | Age: 75
End: 2020-10-14

## 2020-10-14 DIAGNOSIS — R25.1 TREMOR: Primary | ICD-10-CM

## 2020-10-14 DIAGNOSIS — Z96.89 STATUS POST DEEP BRAIN STIMULATOR PLACEMENT: ICD-10-CM

## 2020-10-14 DIAGNOSIS — R25.1 TREMOR: ICD-10-CM

## 2020-10-14 RX ORDER — CEPHALEXIN 500 MG/1
500 CAPSULE ORAL 4 TIMES DAILY
Qty: 28 CAPSULE | Refills: 0 | Status: SHIPPED | OUTPATIENT
Start: 2020-10-14 | End: 2020-10-14 | Stop reason: DRUGHIGH

## 2020-10-14 NOTE — TELEPHONE ENCOUNTER
Spoke with patient reporting Dr Mckeon has sent in an antibiotic to patient pharmacy near home.    Voices understanding.      Requested patient callback for any questions/conerns with medication at local pharmacy.  Pt has my name and number

## 2020-10-14 NOTE — TELEPHONE ENCOUNTER
Call to patient.  Left detailed slow message to please return call to Laisha Husain and Dr Mckeon at .

## 2020-10-21 ENCOUNTER — TELEPHONE (OUTPATIENT)
Dept: NEUROLOGY | Facility: CLINIC | Age: 75
End: 2020-10-21

## 2020-10-21 DIAGNOSIS — R25.1 TREMOR: Primary | ICD-10-CM

## 2020-10-21 NOTE — TELEPHONE ENCOUNTER
----- Message from Arlin Mitchell RN sent at 10/21/2020 11:48 AM CDT -----  Regarding: Please print and mail  Please print the letter I just wrote and mail to the patient's home.    Thank you,  Arlin

## 2020-10-21 NOTE — TELEPHONE ENCOUNTER
CT head ordered and scheduled xx  Neuropsych order placed for 1 year follow up x  Letter composed x  Datebase updated x  Appointment held x  Message sent to navigator team x    Spoke to patient. He will be coming on 11/4 to see Debbie 9-10:30 then CT head at 11:10 check in.    Letter mail request sent to [facility].

## 2020-10-26 ENCOUNTER — OFFICE VISIT (OUTPATIENT)
Dept: NEUROSURGERY | Facility: CLINIC | Age: 75
End: 2020-10-26
Payer: MEDICARE

## 2020-10-26 VITALS
SYSTOLIC BLOOD PRESSURE: 146 MMHG | HEART RATE: 73 BPM | RESPIRATION RATE: 16 BRPM | DIASTOLIC BLOOD PRESSURE: 78 MMHG | OXYGEN SATURATION: 97 %

## 2020-10-26 DIAGNOSIS — Z96.89 STATUS POST DEEP BRAIN STIMULATOR PLACEMENT: ICD-10-CM

## 2020-10-26 DIAGNOSIS — R25.1 TREMOR: Primary | ICD-10-CM

## 2020-10-26 PROCEDURE — 99024 POSTOP FOLLOW-UP VISIT: CPT | Performed by: NEUROLOGICAL SURGERY

## 2020-10-26 ASSESSMENT — PAIN SCALES - GENERAL: PAINLEVEL: NO PAIN (0)

## 2020-10-26 NOTE — PROGRESS NOTES
NEUROSURGERY    HISTORY AND PHYSICAL EXAM    Chief Complaint   Patient presents with     Follow Up     [facility] RETURN, WOUND CHECK, S/P LEFT SIDE DBS PHASE I AND PHASE II SURGERIES       HISTORY OF PRESENT ILLNESS  Mr. Duane Faymoville returns today for his follow-up.  He is s/p left side deep brain stimulator placement, phase I, placement of left side deep brain stimulator electrode, target left ventral intermediate nucleus of the thalamus, with microelectrode recording on 10/06/2020 and s/p deep brain stimulator placement, phase II, placement of deep brain stimulator generator/battery over the left chest wall on 10/13/2020.  He tolerated both of the procedures well and there were no complications.  After his phase I surgery, his postoperative CT head was without any concerning findings and he was discharged to home on POD#1.  His phase II surgery was also without any complications and he was discharged to home on the same day.  Patient reports that he is doing well and there are no complaints.  He reports no pain.  He denies any fevers, chills, nausea, vomiting, dizziness, weakness, numbness or seizure like activity.  He reports that the incisions are looking good and there are no redness, no drainage and no fluid collection.  Overall, he is quite happy with the recent surgery.  He still has good tremor control as he seems to have continued lesion effect.         In summary, patient is a 75 year old right handed male who is diagnosed with essential tremor.  In his family, only one of his sons have tremor in the hands that began at age 30.  In terms of the patient, he began having postural action tremors of his right arm about 10 years ago.  The tremor was exacerbated by fatigue and stress/anxiety.  He does not have tremor in his legs and he does not have head tremor.  He is not sure if alcohol alleviates his tremor as he does not drink much.  His tremor has gotten worse as it now involves both hands.  However, it is  worse on the right side or at least more noticeable on the right side.  He does have some coping strategies such as holding his arm to help him with writing.  His writing is severely affected.  He cannot hold objects and tools.  It is also affecting other ADLs.  The goal of the DBS surgery is to be able to write and be able to perform household book keeping.  He wants better quality of life overall.  He would like both sides treated but he would be more happy if his right arm was tremor free.  His case was discussed at the Movement Disorder Consensus Group Meeting and he was considered to be a good candidate.  Recommendations were wait and see strategy, left side Vim, Abbot device.        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, antepartum        Past Surgical History:   Procedure Laterality Date     COLONOSCOPY  06/01/2015     HERNIA REPAIR, UMBILICAL  10/01/2012     IMPLANT DEEP BRAIN STIMULATION GENERATOR / BATTERY Left 10/13/2020    Procedure: Deep brain stimulator placement, phase II, placement of deep brain stimulator generator/battery over the left chest wall;  Surgeon: Darin Mckeon MD;  Location: [facility] OR     OPTICAL TRACKING SYSTEM INSERTION DEEP BRAIN STIMULATION Left 10/6/2020    Procedure: stealth assisted Left side deep brain stimulator placement, phase I, placement of left side deep brain stimulator electrode, target left ventral intermediate nucleus of the thalamus, with microelectrode recording;  Surgeon: Darin Mckeon MD;  Location: [facility] OR     ORTHOPEDIC SURGERY Left     ORIF left forearm after fracture     PROSTATE SURGERY   03/01/2015     VASCULAR SURGERY Left 04/01/2012    left leg varicose vein surgery     VASCULAR SURGERY Right 03/01/2012    right leg varicose vein surgery       Social History     Socioeconomic History     Marital status:      Spouse name: Not on file     Number of children: Not on file     Years of education: Not on file     Highest education level: Not on file   Occupational History     Not on file   Social Needs     Financial resource strain: Not on file     Food insecurity     Worry: Not on file     Inability: Not on file     Transportation needs     Medical: Not on file     Non-medical: Not on file   Tobacco Use     Smoking status: Never Smoker     Smokeless tobacco: Never Used   Substance and Sexual Activity     Alcohol use: Yes     Comment: very little     Drug use: Not Currently     Sexual activity: Not on file   Lifestyle     Physical activity     Days per week: Not on file     Minutes per session: Not on file     Stress: Not on file   Relationships     Social connections     Talks on phone: Not on file     Gets together: Not on file     Attends Shinto service: Not on file     Active member of club or organization: Not on file     Attends meetings of clubs or organizations: Not on file     Relationship status: Not on file     Intimate partner violence     Fear of current or ex partner: Not on file     Emotionally abused: Not on file     Physically abused: Not on file     Forced sexual activity: Not on file   Other Topics Concern     Parent/sibling w/ CABG, MI or angioplasty before 65F 55M? Not Asked   Social History Narrative     Not on file       Family History   Problem Relation Age of Onset     Dementia Mother      Cancer Sister        Current Outpatient Medications   Medication     atorvastatin (LIPITOR) 10 MG tablet     enalapril (VASOTEC) 2.5 MG tablet     metFORMIN (GLUCOPHAGE) 500 MG tablet     oxyCODONE (ROXICODONE) 5 MG tablet     senna-docusate (SENOKOT-S/PERICOLACE) 8.6-50 MG tablet      No current facility-administered medications for this visit.        No Known Allergies      REVIEW OF SYSTEMS - also per HPI.  General: Negative for chills/sweats/fever, difficulty sleeping, headache, recent fatigue, or weight gain/loss.  Eyes: Negative for blurred vision, crossed eyes, double vision, recent eye infections, vision flashes, or vision halos.  Ears/Nose/Mouth/Throat: Negative for bleeding gums, difficulty swallowing, earache, ear discharge, hearing loss, hoarseness, nosebleeds, tinnitus, or sinus problems.  Respiratory:Negative for chronic cough, coughing blood, night sweats, shortness of breath, Tuberculosis, or wheezing.  Cardiovascular: Negative for chest pain, dyspnea at night, heart murmur, palpitations, pacemaker, pacemaker, poor circulation, swollen legs/feet, or varicose veins.  Gastrointestinal: POSITIVE for constipation and peptic ulcer disease.  Negative for melena, hematochezia, chronic diarrhea, heartburn, Hepatitis A/B/C, Liver Disease, nausea, or vomiting.   Genitourinary: POSITIVE for erectile dysfunction and prostate problems s/p prostectomy.  Negative for Urinary retention, genital discharge, urinary incontinence, urgency, or UTI.   Neurological: POSITIVE for tremors.  Negative for syncope, headaches, numbness of arms/legs, tingling in hands/arms/legs, memory problems, or seizures.  Psychological: Negative for anxiety, depression, panic attacks, or restlessness.  Skin: Negative for chronic skin itching, color changes in hand/feet when cold, poor scarring, non-healing ulcers, skin rashes/hives, unusual moles.  Musculoskeletal: Negative for arthritis, joint swelling in hands/wrists/hips/knees/joints, muscle tenderness in arms/legs, or osteoporosis.  Endocrine: Negative for excessive thirst/hunger, intolerance for warm rooms, loss of libido, multiple broken bones, rapid weight gain/loss, galactorrhea, or thyroid issues.  Hematologic/Lymphatic: Negative for easy skin bruising,  significant fatigue, prolonged bleeding, tender glands/lymph nodes.  Allergies: Negative for asthma or hay fever.      PHYSICAL EXAM  BP (!) 146/78   Pulse 73   Resp 16   SpO2 97%     General: Awake, alert, oriented. Well nourished, well developed, no acute distress.  HEENT: Head normocephalic, atraumatic. No carotid bruit. Neck supple. Good range of motion. No palpable thyroid mass.  Heart: Regular rhythm and rate. No murmurs.  Lungs: Clear to auscultation and percussion bilaterally. No rhonchi, rales or wheeze.  Abdomen: Soft, non-tender, non-distended. No hepatosplenomegaly.  Extremity: Warm with no clubbing or cyanosis. No lower extremity edema.  Left frontal incision is healing well.  No redness.  No drainage.  No fluid collection.  Left chest wall incision is healing well.  No redness.  No drainage.  No fluid collection.  Remaining skin glue was removed without any difficulty.    Neurological:  Awake, alert and oriented to date, time, place and person. Speech fluent.   Pupils equal, round, reactive to light.  Extraocular movement intact.  Visual fields are full on confrontation.  Hearing is grossly normal to finger rub.   Facial sensation intact.  Face symmetric.  Tongue midline.  Uvula elevates equally.    Motor: full strength throughout.  Sensation: intact to light touch and pinpoint.  Deep tendon reflexes: 1+ throughout. Negative for clonus. Negative for Rodríguez's sign. No dysmetria.  No tremor noted on the right side.    ASSESSMENT   75 year old right handed male with essential tremor.  S/p left side deep brain stimulator placement, phase I, placement of left side deep brain stimulator electrode, target left ventral intermediate nucleus of the thalamus, with microelectrode recording on 10/06/2020  S/p deep brain stimulator placement, phase II, placement of deep brain stimulator generator/battery over the left chest wall on 10/13/2020      Mr. Bang has done well with his phase I and phase II  surgeries.  He still has some lesion effect.  He has no complaints.  His incisions are healing well.  He is scheduled to have his DBS programmed soon.      PLAN:  1.  Follow up with neurosurgery as needed.

## 2020-10-26 NOTE — LETTER
10/26/2020       RE: Duane Faymoville  [address] [place] [address]     Dear Colleague,    Thank you for referring your patient, Duane Faymoville, to the [facility] [place] [facility]. Please see a copy of my visit note below.    NEUROSURGERY    HISTORY AND PHYSICAL EXAM    Chief Complaint   Patient presents with     Follow Up     [facility] RETURN, WOUND CHECK, S/P LEFT SIDE DBS PHASE I AND PHASE II SURGERIES       HISTORY OF PRESENT ILLNESS  Mr. Duane Faymoville returns today for his follow-up.  He is s/p left side deep brain stimulator placement, phase I, placement of left side deep brain stimulator electrode, target left ventral intermediate nucleus of the thalamus, with microelectrode recording on 10/06/2020 and s/p deep brain stimulator placement, phase II, placement of deep brain stimulator generator/battery over the left chest wall on 10/13/2020.  He tolerated both of the procedures well and there were no complications.  After his phase I surgery, his postoperative CT head was without any concerning findings and he was discharged to home on POD#1.  His phase II surgery was also without any complications and he was discharged to home on the same day.  Patient reports that he is doing well and there are no complaints.  He reports no pain.  He denies any fevers, chills, nausea, vomiting, dizziness, weakness, numbness or seizure like activity.  He reports that the incisions are looking good and there are no redness, no drainage and no fluid collection.  Overall, he is quite happy with the recent surgery.  He still has good tremor control as he seems to have continued lesion effect.         In summary, patient is a 75 year old right handed male who is diagnosed with essential tremor.  In his family, only one of his sons have tremor in the hands that began at age 30.  In terms of the patient, he began having postural  action tremors of his right arm about 10 years ago.  The tremor was exacerbated by fatigue and stress/anxiety.  He does not have tremor in his legs and he does not have head tremor.  He is not sure if alcohol alleviates his tremor as he does not drink much.  His tremor has gotten worse as it now involves both hands.  However, it is worse on the right side or at least more noticeable on the right side.  He does have some coping strategies such as holding his arm to help him with writing.  His writing is severely affected.  He cannot hold objects and tools.  It is also affecting other ADLs.  The goal of the DBS surgery is to be able to write and be able to perform household book keeping.  He wants better quality of life overall.  He would like both sides treated but he would be more happy if his right arm was tremor free.  His case was discussed at the Movement Disorder Consensus Group Meeting and he was considered to be a good candidate.  Recommendations were wait and see strategy, left side Vim, Abbot device.        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, antepartum        Past Surgical History:   Procedure Laterality Date     COLONOSCOPY  06/01/2015     HERNIA REPAIR, UMBILICAL  10/01/2012     IMPLANT DEEP BRAIN STIMULATION GENERATOR / BATTERY Left 10/13/2020    Procedure: Deep brain stimulator placement, phase II, placement of deep brain stimulator generator/battery over the left chest wall;  Surgeon: Darin Mckeon MD;  Location: [facility] OR     OPTICAL TRACKING SYSTEM INSERTION DEEP BRAIN STIMULATION Left 10/6/2020    Procedure: stealth assisted Left  side deep brain stimulator placement, phase I, placement of left side deep brain stimulator electrode, target left ventral intermediate nucleus of the thalamus, with microelectrode recording;  Surgeon: Darin Mckeon MD;  Location: [facility] OR     ORTHOPEDIC SURGERY Left     ORIF left forearm after fracture     PROSTATE SURGERY  03/01/2015     VASCULAR SURGERY Left 04/01/2012    left leg varicose vein surgery     VASCULAR SURGERY Right 03/01/2012    right leg varicose vein surgery       Social History     Socioeconomic History     Marital status:      Spouse name: Not on file     Number of children: Not on file     Years of education: Not on file     Highest education level: Not on file   Occupational History     Not on file   Social Needs     Financial resource strain: Not on file     Food insecurity     Worry: Not on file     Inability: Not on file     Transportation needs     Medical: Not on file     Non-medical: Not on file   Tobacco Use     Smoking status: Never Smoker     Smokeless tobacco: Never Used   Substance and Sexual Activity     Alcohol use: Yes     Comment: very little     Drug use: Not Currently     Sexual activity: Not on file   Lifestyle     Physical activity     Days per week: Not on file     Minutes per session: Not on file     Stress: Not on file   Relationships     Social connections     Talks on phone: Not on file     Gets together: Not on file     Attends Denominational service: Not on file     Active member of club or organization: Not on file     Attends meetings of clubs or organizations: Not on file     Relationship status: Not on file     Intimate partner violence     Fear of current or ex partner: Not on file     Emotionally abused: Not on file     Physically abused: Not on file     Forced sexual activity: Not on file   Other Topics Concern     Parent/sibling w/ CABG, MI or angioplasty before 65F 55M? Not Asked   Social History Narrative     Not on file       Family History    Problem Relation Age of Onset     Dementia Mother      Cancer Sister        Current Outpatient Medications   Medication     atorvastatin (LIPITOR) 10 MG tablet     enalapril (VASOTEC) 2.5 MG tablet     metFORMIN (GLUCOPHAGE) 500 MG tablet     oxyCODONE (ROXICODONE) 5 MG tablet     senna-docusate (SENOKOT-S/PERICOLACE) 8.6-50 MG tablet     No current facility-administered medications for this visit.        No Known Allergies      REVIEW OF SYSTEMS - also per HPI.  General: Negative for chills/sweats/fever, difficulty sleeping, headache, recent fatigue, or weight gain/loss.  Eyes: Negative for blurred vision, crossed eyes, double vision, recent eye infections, vision flashes, or vision halos.  Ears/Nose/Mouth/Throat: Negative for bleeding gums, difficulty swallowing, earache, ear discharge, hearing loss, hoarseness, nosebleeds, tinnitus, or sinus problems.  Respiratory:Negative for chronic cough, coughing blood, night sweats, shortness of breath, Tuberculosis, or wheezing.  Cardiovascular: Negative for chest pain, dyspnea at night, heart murmur, palpitations, pacemaker, pacemaker, poor circulation, swollen legs/feet, or varicose veins.  Gastrointestinal: POSITIVE for constipation and peptic ulcer disease.  Negative for melena, hematochezia, chronic diarrhea, heartburn, Hepatitis A/B/C, Liver Disease, nausea, or vomiting.   Genitourinary: POSITIVE for erectile dysfunction and prostate problems s/p prostectomy.  Negative for Urinary retention, genital discharge, urinary incontinence, urgency, or UTI.   Neurological: POSITIVE for tremors.  Negative for syncope, headaches, numbness of arms/legs, tingling in hands/arms/legs, memory problems, or seizures.  Psychological: Negative for anxiety, depression, panic attacks, or restlessness.  Skin: Negative for chronic skin itching, color changes in hand/feet when cold, poor scarring, non-healing ulcers, skin rashes/hives, unusual moles.  Musculoskeletal: Negative for  arthritis, joint swelling in hands/wrists/hips/knees/joints, muscle tenderness in arms/legs, or osteoporosis.  Endocrine: Negative for excessive thirst/hunger, intolerance for warm rooms, loss of libido, multiple broken bones, rapid weight gain/loss, galactorrhea, or thyroid issues.  Hematologic/Lymphatic: Negative for easy skin bruising, significant fatigue, prolonged bleeding, tender glands/lymph nodes.  Allergies: Negative for asthma or hay fever.      PHYSICAL EXAM  BP (!) 146/78   Pulse 73   Resp 16   SpO2 97%     General: Awake, alert, oriented. Well nourished, well developed, no acute distress.  HEENT: Head normocephalic, atraumatic. No carotid bruit. Neck supple. Good range of motion. No palpable thyroid mass.  Heart: Regular rhythm and rate. No murmurs.  Lungs: Clear to auscultation and percussion bilaterally. No rhonchi, rales or wheeze.  Abdomen: Soft, non-tender, non-distended. No hepatosplenomegaly.  Extremity: Warm with no clubbing or cyanosis. No lower extremity edema.  Left frontal incision is healing well.  No redness.  No drainage.  No fluid collection.  Left chest wall incision is healing well.  No redness.  No drainage.  No fluid collection.  Remaining skin glue was removed without any difficulty.    Neurological:  Awake, alert and oriented to date, time, place and person. Speech fluent.   Pupils equal, round, reactive to light.  Extraocular movement intact.  Visual fields are full on confrontation.  Hearing is grossly normal to finger rub.   Facial sensation intact.  Face symmetric.  Tongue midline.  Uvula elevates equally.    Motor: full strength throughout.  Sensation: intact to light touch and pinpoint.  Deep tendon reflexes: 1+ throughout. Negative for clonus. Negative for Rodríguez's sign. No dysmetria.  No tremor noted on the right side.    ASSESSMENT   75 year old right handed male with essential tremor.  S/p left side deep brain stimulator placement, phase I, placement of left side deep  brain stimulator electrode, target left ventral intermediate nucleus of the thalamus, with microelectrode recording on 10/06/2020  S/p deep brain stimulator placement, phase II, placement of deep brain stimulator generator/battery over the left chest wall on 10/13/2020      Mr. Bang has done well with his phase I and phase II surgeries.  He still has some lesion effect.  He has no complaints.  His incisions are healing well.  He is scheduled to have his DBS programmed soon.      PLAN:  1.  Follow up with neurosurgery as needed.      Again, thank you for allowing me to participate in the care of your patient.      Sincerely,    Darin Mckeon MD

## 2020-10-26 NOTE — LETTER
10/26/2020       RE: Duane Faymoville  [address] [facility] [place] [address]     Dear Colleague,    Thank you for referring your patient, Duane Faymoville, to the [facility]UROS[facility]GERY [facility] [place] [facility]. Please see a copy of my visit note below.    NEUROSURGERY    HISTORY AND PHYSICAL EXAM    Chief Complaint   Patient presents with     Follow Up     [facility] RETURN, WOUND CHECK, S/P LEFT SIDE DBS PHASE I AND PHASE II SURGERIES     HISTORY OF PRESENT ILLNESS  Mr. Duane Faymoville returns today for his follow-up.  He is s/p left side deep brain stimulator placement, phase I, placement of left side deep brain stimulator electrode, target left ventral intermediate nucleus of the thalamus, with microelectrode recording on 10/06/2020 and s/p deep brain stimulator placement, phase II, placement of deep brain stimulator generator/battery over the left chest wall on 10/13/2020.  He tolerated both of the procedures well and there were no complications.  After his phase I surgery, his postoperative CT head was without any concerning findings and he was discharged to home on POD#1.  His phase II surgery was also without any complications and he was discharged to home on the same day.  Patient reports that he is doing well and there are no complaints.  He reports no pain.  He denies any fevers, chills, nausea, vomiting, dizziness, weakness, numbness or seizure like activity.  He reports that the incisions are looking good and there are no redness, no drainage and no fluid collection.  Overall, he is quite happy with the recent surgery.  He still has good tremor control as he seems to have continued lesion effect.         In summary, patient is a 75 year old right handed male who is diagnosed with essential tremor.  In his family, only one of his sons have tremor in the hands that began at age 30.  In terms of the patient, he began having postural  action tremors of his right arm about 10 years ago.  The tremor was exacerbated by fatigue and stress/anxiety.  He does not have tremor in his legs and he does not have head tremor.  He is not sure if alcohol alleviates his tremor as he does not drink much.  His tremor has gotten worse as it now involves both hands.  However, it is worse on the right side or at least more noticeable on the right side.  He does have some coping strategies such as holding his arm to help him with writing.  His writing is severely affected.  He cannot hold objects and tools.  It is also affecting other ADLs.  The goal of the DBS surgery is to be able to write and be able to perform household book keeping.  He wants better quality of life overall.  He would like both sides treated but he would be more happy if his right arm was tremor free.  His case was discussed at the Movement Disorder Consensus Group Meeting and he was considered to be a good candidate.  Recommendations were wait and see strategy, left side Vim, Abbot device.    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, antepartum      Past Surgical History:   Procedure Laterality Date     COLONOSCOPY  06/01/2015     HERNIA REPAIR, UMBILICAL  10/01/2012     IMPLANT DEEP BRAIN STIMULATION GENERATOR / BATTERY Left 10/13/2020    Procedure: Deep brain stimulator placement, phase II, placement of deep brain stimulator generator/battery over the left chest wall;  Surgeon: Darin Mckeon MD;  Location: [facility] OR     OPTICAL TRACKING SYSTEM INSERTION DEEP BRAIN STIMULATION Left 10/6/2020    Procedure: stealth assisted Left side  deep brain stimulator placement, phase I, placement of left side deep brain stimulator electrode, target left ventral intermediate nucleus of the thalamus, with microelectrode recording;  Surgeon: Darin Mckeon MD;  Location: [facility] OR     ORTHOPEDIC SURGERY Left     ORIF left forearm after fracture     PROSTATE SURGERY  03/01/2015     VASCULAR SURGERY Left 04/01/2012    left leg varicose vein surgery     VASCULAR SURGERY Right 03/01/2012    right leg varicose vein surgery     Social History     Socioeconomic History     Marital status:      Spouse name: Not on file     Number of children: Not on file     Years of education: Not on file     Highest education level: Not on file   Occupational History     Not on file   Social Needs     Financial resource strain: Not on file     Food insecurity     Worry: Not on file     Inability: Not on file     Transportation needs     Medical: Not on file     Non-medical: Not on file   Tobacco Use     Smoking status: Never Smoker     Smokeless tobacco: Never Used   Substance and Sexual Activity     Alcohol use: Yes     Comment: very little     Drug use: Not Currently     Sexual activity: Not on file   Lifestyle     Physical activity     Days per week: Not on file     Minutes per session: Not on file     Stress: Not on file   Relationships     Social connections     Talks on phone: Not on file     Gets together: Not on file     Attends Restorationism service: Not on file     Active member of club or organization: Not on file     Attends meetings of clubs or organizations: Not on file     Relationship status: Not on file     Intimate partner violence     Fear of current or ex partner: Not on file     Emotionally abused: Not on file     Physically abused: Not on file     Forced sexual activity: Not on file   Other Topics Concern     Parent/sibling w/ CABG, MI or angioplasty before 65F 55M? Not Asked   Social History Narrative     Not on file       Family History   Problem  Relation Age of Onset     Dementia Mother      Cancer Sister      Current Outpatient Medications   Medication     atorvastatin (LIPITOR) 10 MG tablet     enalapril (VASOTEC) 2.5 MG tablet     metFORMIN (GLUCOPHAGE) 500 MG tablet     oxyCODONE (ROXICODONE) 5 MG tablet     senna-docusate (SENOKOT-S/PERICOLACE) 8.6-50 MG tablet     No current facility-administered medications for this visit.      No Known Allergies    REVIEW OF SYSTEMS - also per HPI.  General: Negative for chills/sweats/fever, difficulty sleeping, headache, recent fatigue, or weight gain/loss.  Eyes: Negative for blurred vision, crossed eyes, double vision, recent eye infections, vision flashes, or vision halos.  Ears/Nose/Mouth/Throat: Negative for bleeding gums, difficulty swallowing, earache, ear discharge, hearing loss, hoarseness, nosebleeds, tinnitus, or sinus problems.  Respiratory:Negative for chronic cough, coughing blood, night sweats, shortness of breath, Tuberculosis, or wheezing.  Cardiovascular: Negative for chest pain, dyspnea at night, heart murmur, palpitations, pacemaker, pacemaker, poor circulation, swollen legs/feet, or varicose veins.  Gastrointestinal: POSITIVE for constipation and peptic ulcer disease.  Negative for melena, hematochezia, chronic diarrhea, heartburn, Hepatitis A/B/C, Liver Disease, nausea, or vomiting.   Genitourinary: POSITIVE for erectile dysfunction and prostate problems s/p prostectomy.  Negative for Urinary retention, genital discharge, urinary incontinence, urgency, or UTI.   Neurological: POSITIVE for tremors.  Negative for syncope, headaches, numbness of arms/legs, tingling in hands/arms/legs, memory problems, or seizures.  Psychological: Negative for anxiety, depression, panic attacks, or restlessness.  Skin: Negative for chronic skin itching, color changes in hand/feet when cold, poor scarring, non-healing ulcers, skin rashes/hives, unusual moles.  Musculoskeletal: Negative for arthritis, joint swelling  in hands/wrists/hips/knees/joints, muscle tenderness in arms/legs, or osteoporosis.  Endocrine: Negative for excessive thirst/hunger, intolerance for warm rooms, loss of libido, multiple broken bones, rapid weight gain/loss, galactorrhea, or thyroid issues.  Hematologic/Lymphatic: Negative for easy skin bruising, significant fatigue, prolonged bleeding, tender glands/lymph nodes.  Allergies: Negative for asthma or hay fever.    PHYSICAL EXAM  BP (!) 146/78   Pulse 73   Resp 16   SpO2 97%     General: Awake, alert, oriented. Well nourished, well developed, no acute distress.  HEENT: Head normocephalic, atraumatic. No carotid bruit. Neck supple. Good range of motion. No palpable thyroid mass.  Heart: Regular rhythm and rate. No murmurs.  Lungs: Clear to auscultation and percussion bilaterally. No rhonchi, rales or wheeze.  Abdomen: Soft, non-tender, non-distended. No hepatosplenomegaly.  Extremity: Warm with no clubbing or cyanosis. No lower extremity edema.  Left frontal incision is healing well.  No redness.  No drainage.  No fluid collection.  Left chest wall incision is healing well.  No redness.  No drainage.  No fluid collection.  Remaining skin glue was removed without any difficulty.    Neurological:  Awake, alert and oriented to date, time, place and person. Speech fluent.   Pupils equal, round, reactive to light.  Extraocular movement intact.  Visual fields are full on confrontation.  Hearing is grossly normal to finger rub.   Facial sensation intact.  Face symmetric.  Tongue midline.  Uvula elevates equally.    Motor: full strength throughout.  Sensation: intact to light touch and pinpoint.  Deep tendon reflexes: 1+ throughout. Negative for clonus. Negative for Rodríguez's sign. No dysmetria.  No tremor noted on the right side.    ASSESSMENT   75 year old right handed male with essential tremor.  S/p left side deep brain stimulator placement, phase I, placement of left side deep brain stimulator electrode,  target left ventral intermediate nucleus of the thalamus, with microelectrode recording on 10/06/2020  S/p deep brain stimulator placement, phase II, placement of deep brain stimulator generator/battery over the left chest wall on 10/13/2020    Mr. Bang has done well with his phase I and phase II surgeries.  He still has some lesion effect.  He has no complaints.  His incisions are healing well.  He is scheduled to have his DBS programmed soon.    PLAN:  1.  Follow up with neurosurgery as needed.    Again, thank you for allowing me to participate in the care of your patient.  Sincerely,    Darin Mckeon MD

## 2020-10-27 ENCOUNTER — TELEPHONE (OUTPATIENT)
Dept: NEUROLOGY | Facility: CLINIC | Age: 75
End: 2020-10-27

## 2020-10-27 NOTE — OP NOTE
PATIENT NAME: FAYMOVILLE, DUANE  YOB: 1945  MRN:   [id number]  ACCOUNT:  [id number]      DATE OF PROCEDURE:  10/06/2020    PREOPERATIVE DIAGNOSIS:    1.  Essential tremor  2.  Right side tremor worse than left side tremor    POSTOPERATIVE DIAGNOSIS:    1.  Essential tremor  2.  Right side tremor worse than left side tremor     PROCEDURES PERFORMED:   1.  Placement of CRW stereotactic headframe.   2.  Stereotactic neuronavigation planning and CT of head.   3.  Stereotactic neuronavigation using the Syn[employer]ronica system for surgical planning, targeting and approach. The target is left ventral intermediate nucleus of the thalamus (Vim).   4.  Left side deep brain stimulator placement, phase I, placement of left side deep brain stimulator electrode, target is left ventral intermediate nucleus of the thalamus (Vim).   5.  Use of intraoperative microelectrode recording.   6.  Use of intraoperative fluoroscopy.    ATTENDING SURGEON:  Darin Mckeon MD, PhD     RESIDENT SURGEON:  Cesario Christian MD     ANESTHESIA:  Monitored anesthesia care and local anesthetic.     ESTIMATED BLOOD LOSS:  5 mL.     COMPLICATIONS:  None.     IMPLANTS:  1.  Abbott Infinity 0.5 DBS electrode, REF [id number], S/N [id number].  2.  Abbott Guardian ana rosa hole cover, REF 6010, Lot #[id number].    FINDINGS:  Final electrode location, anterior Karlos Gun position, at 1.2 mm below target.    INDICATIONS FOR PROCEDURE:  Mr. Duane Faymoville is a 75 year old right handed male who is diagnosed with essential tremor.  In his family, only one of his sons have tremor in the hands that began at age 30.  In terms of the patient, he began having postural action tremors of his right arm about 10 years ago.  The tremor was exacerbated by fatigue and stress/anxiety.  He does not have tremor in his legs and he does not have head tremor.  He is not sure if alcohol alleviates his tremor as he does not drink much.  His tremor has gotten worse as it now involves both  hands.  However, it is worse on the right side or at least more noticeable on the right side.  He does have some coping strategies such as holding his arm to help him with writing.  His writing is severely affected.  He cannot hold objects and tools.  It is also affecting other ADLs.  The goal of the DBS surgery is to be able to write and be able to perform household book keeping.  He wants better quality of life overall.  He would like both sides treated but he would be more happy if his right arm was tremor free.  His case was discussed at the Movement Disorder Consensus Group Meeting and he was considered to be a good candidate.  Recommendations were wait and see strategy, left side Vim, Abbot device.  We discussed both phase I and II of DBS surgery.  We discussed that during phase I, we would place the DBS electrode on the left side under MAC and microelectrode recording.  He would then return one week later for the phase II with placement of the DBS generator/battery over the left chest wall under general anesthesia.  Since he is a unilateral candidate or wait and see strategy candidate, he will undergo another evaluation/discussion prior to proceeding to the right side implantation.  Risks, benefits, alternative therapies were discussed with the patient, including but not limited to infection and bleeding (intracranial), injury to the brain, stroke, death, hardware failure and possible need for more surgeries.  Surgical procedure was discussed in detail.  All questions were answered, and he expressed understanding.     DESCRIPTION OF PROCEDURE:      CRW head frame placement and stereotactic neuronavigation.      Informed consent was obtained.  The patient was brought to the operating room and kept on the gurney in a sitting position.  He was identified and a brief timeout was performed for the placement of the CRW head frame.  He was given sedation to make him comfortable.  The intended pin sites, two in the  front and two in the back of the head, were cleaned and were injected with local anesthetic, 1% lidocaine with epinephrine.  A total of 22 mL were used during this portion of the surgical case.  The CRW stereotactic head frame was placed onto his head with 4 pins for fixation.  Care was taken so that the fiducial box wound fit over the head and the frame.  Once the head frame was on, the fiducial box was easily placed without interference from his forehead.  While sedated, kenny catheter was also placed.  The patient tolerated the procedure well and his sedation was lightened and he was awakened.      After the CRW stereotactic head frame was placed, he was taken directly to the CT scanner, at which time a CT scan of the head stereotactic protocol was obtained.  Subsequently, the patient was taken back up to the operating room where he was placed supine on the operative bed with the CRW head frame affixed to the bed as well.  Patient was in a slight sitting up position with the neck in a neutral position and he was made comfortable.  The bed was positioned so that it would be a beach chair reclining position (head up, legs down, body trendelenburg).  All pressure points were well padded.  Care was taken to make sure that the neck was in neutral position and that the Stoddard head reid device had room for manipulation in case more flexion or extension is needed throughout the case.     At this time, attention was turned to the neuronavigation imaging that was obtained.  The Stealth neuronavigation device was loaded with the CT head that had just been obtained.  The device also had an MRI of the head, stereotactic protocol, that was obtained prior to surgery.  CT of the head was merged with the previously obtained MRI of the brain.  The merge demonstrated good coherence/registration.  Then, using this merged image, neuronavigation was used to stereotactically target the left ventral intermediate nucleus of the  thalamus (Vim).  The technique used was the AC-PC line localization technique to indirectly target the Vim using stereotactic coordinates and the X, Y and Z as well as the ring and arc angles for the CRW head frame were obtained for the left side.  The entry into the skull, as well as the trajectory of the electrode were checked with a probe's eye view to avoid any sulci, ventricle or vascular structures.     The stereotactic coordinates for the left side was then transferred to the [facility] stereotactic targeting apparatus as well as the phantom base.  The coordinates were confirmed and double checked for accuracy.  Accuracy was also confirmed using phantom base.  The coordinates were X = -15.6, Y = -12.7, Z = +0.1, ring angle = 54.2 degrees anterior, and arc angle = 21.6 degrees to the left.     At this time, attention was turned back to the patient.  Using a hair clipper, his hair over the left frontal area was clipped.  A semicircular C-shaped incision was planned on the left side and this was marked.  Then, the surgical area was prepped and draped in routine surgical fashion.  We also prepped the area posterior to this as well as area towards the left parietal area.  The patient was also made comfortable.     Timeout was then performed confirming the patient's identity, procedure to be performed, side and site of surgery identified, imaging corresponding with the patient and administration of preoperative prophylactic antibiotic.    Left-sided electrode placement with microelectrode recording: Target left Vim.     The CRW targeting apparatus was attached to the head frame on top of the sterile drapes.  The entry point was marked on the scalp on the left side.  A C-shaped incision was made with a skin knife, after the area was injected with local anesthetic, 1% Lidocaine with epinephrine and 1/4% Marcaine plain, 50:50 mix.  The area posterior to the incision was also injected as a pocket would be created.  Area  posterior lateral, towards the left parietal area was also injected as the electrode connector will be tunneled here later.  Total of 12 mL of the above mentioned local anesthetic was used.  The incision was then further carried down to the pericranium.  Hemostasis was obtained using monopolar and bipolar cautery.  Thin layer of pericranial tissue was left behind on the scalp and the skin flap was reflected posteriorly.  Then, using a blunt dissection technique, a pocket was created posteriorly as well as a track that was made towards the left parietal area for later use.    At this time, the targeting apparatus again positioned and the entry point to the skull was marked.  Area of the intended ana rosa hole was cleaned and pericranial tissue removed.  Then using an acorn drill, ana rosa hole was created over this entry point to expose the dura.  The area was vigorously irrigated and bone wax used to control the bone bleeding.  Dura was coagulated with bipolar cautery for hemostasis, and again no active bleeding was noted.     At this time, the electrode fixation cover was fixed to the skull using two screws.  Care was taken to overlap the pericranium over the edge of the cover to provide a smooth tissue transition.  Then, the electrode drive was attached to the targeting apparatus.  The entry into the dura was again checked.  It appeared that all positions of the Karlos Gun electrode reid were accessible without any interference from the bone edge.  Then using a Bovie cautery and a #4 Penfield instrument, opening was made into the dura, as well as a small corticectomy.  No active bleeding was noted.    Dr. Ankur Carreon and Ms. Mercedez Moon of the Neurology Department participated in the recording and neurological testing.  During the microelectrode recording and testing, Dr. Carreon and Ms. Moon took detailed notes of the electrophysiologic data and neurologic exam as well as any stimulation effects.    At this time, the  electrode guide tubes were inserted in the center and posterior Karlos Gun positions and they were advanced slowly until they were fully inserted at which point the tips would be well above the target.  No abnormal resistance was noted.  Duraseal was used to seal the entry site to provide a seal and to minimize cerebrospinal fluid leak and air entry.  Then, recording microelectrodes were brought in and placed within the guide tubes and they were connected for intraoperative recording.  The tips of the electrode were now 15 mm above target.  The patient was awakened.  Then, using a micro drive, the electrodes were slowly advanced towards the target, collecting microelectrode recording data for mapping the track.  The center track yielded robust cell activity throughout trajectory with somatosensory response in shoulder, hip, elbow, wrist, fingers, and possible tongue.  They were all proprioceptive.  No Vc was encountered.  Microstimulation produced high paresthesia threshold (60 to 100 microamps).  Ring stimulation resulted in paresthesia at 0.5 mA which were mostly upper extremity and fingertips.  The posterior track yielded robust cell activity throughout trajectory with somatosensory response in elbow, wrist (active), hip, elbow, shoulder and tongue (proprioceptive) and tongue/orolingual (tactile).  Approximately 3.2 mm to 4.5 mm of Vc was encountered.  Microstimulation resulted in moderate to high paresthesia thresholds (40 to 90 microamps) in upper extremity and fingertips.  Even prior to HETAL, patient had some lesion effect.      Based on the mapping data, it was decided that the current anterior Karlos Gun position may be the best placement for the permanent electrode.  The electrode drive was then positioned to the desired position with the micro drive and the electrodes pulled out of the guide tubes.  Another guide tube was inserted in the anterior Karlos Gun position and it was advanced slowly until it was fully  inserted at which point the tip would be well above the target.  No abnormal resistance was noted.  Duraseal was used to seal the entry site to provide a seal and to minimize cerebrospinal fluid leak and air entry.  The permanent DBS electrode was brought into the field and placed in the anterior Karlos Gun guide tube with the tip being placed at 0.5 mm below the target depth.  The DBS electrode was first tested in normal saline to confirm good impedance values prior to its placement into the brain.  The electrode demonstrated good impedance values after placement.  The electrode was tested and stimulation caused further improvement in tremor, mild to no dysmetria and transient paresthesia at 3 mA (1-3+) and 4 mA (3-1+) in fingertips.  Then, the electrode was advanced approximately 1/2 a contact length to the final depth of 1.2 mm below target depth.  Based on the results, it was thought that the current location may be the best location of the permanent electrode.    With the satisfactory testing of the electrode position and the stimulation parameters, the electrode was secured at this location.  The patient was again made comfortable.  The guide tubes were pulled out of the brain.  Duraseal was again used to seal any opening.  The area was generously irrigated.  Hemostasis was also obtained.  Fluoroscopy was brought into the field to test that the electrode did not move with each manipulation.  The electrode tip was seen to be at below the target, as expected.  The electrode clamp was applied to hold the electrode.  Then, the electrode stylet was removed. The electrode was then removed from the electrode reid.  The cap cover was finally placed and secured in place.  Fluoroscopy confirmed that the tip of the electrode did not change in position with each manipulation.  The directional marker, on fluoroscopy, also demonstrated that the contact 2A is facing anteriorly.    The connecting portion of the electrode was  covered with a protective covering/dead-end connector, and this apparatus was inserted into the subgaleal space/pocket towards the left parietal area that was created at the beginning of the case.  The excess wire was also buried posteriorly in the previously created pocket.  Fluoroscopy confirmed that the tip did not move.  The wound was then generously irrigated.    The galeal layer was reapproximated using 3-0 Vicryl sutures in inverted interrupted fashion and the skin was reapproximated using 4-0 Monocryl suture in a running fashion.  The wound was cleaned and dried and prepped with ChoraPrep.  Then, Exofin skin glue was applied.    Removal of the head frame and end of procedure    First, the stereotactic targeting apparatus was removed.  Then, the sterile drapes were removed.  Subsequently, the patient was taken out of the CRW head frame.  The four pin sites were clean and dry and no active bleeding was noted.  Antibiotic ointment was applied to the pin sites.    Patient was further awakened and taken to the recovery room in a stable condition.  At the end of the case, all counts including needle, sponge and instrument counts were correct x 2.  There were no complications.    IEd M.D., Ph.D., Neurosurgery Attending, was present and scrubbed for the entire case and performed the key and critical portions of the case.      ED MARTI MD, PHD

## 2020-10-27 NOTE — TELEPHONE ENCOUNTER
Called patient back to discuss. He still has no tremor symptoms. His initial programming and CT are scheduled for Nov 4th. I asked him to call on Monday and let me know if they are back at that time. If not, I will cancel the 11/4 appointments for scheduling once his symptoms have returned.    Patient verbalized understanding and agreement with this plan of care.

## 2020-10-27 NOTE — TELEPHONE ENCOUNTER
[facility] Call Center    Phone Message    May a detailed message be left on voicemail: yes     Reason for Call: Other: Duane calling in regards to appointment on 11/4 with Cole Leavitt - patient states he received a letter that states if his symptoms have not returned to contact the clinic. Patient states he does not have tremors anymore.     Please advise and call patient back to discuss further    Action Taken: Other: [facility]     Travel Screening: Not Applicable

## 2020-11-03 ENCOUNTER — TELEPHONE (OUTPATIENT)
Dept: NEUROLOGY | Facility: CLINIC | Age: 75
End: 2020-11-03

## 2020-11-03 NOTE — TELEPHONE ENCOUNTER
Patient states that his tremor is back and he plans on coming to his appointment tomorrow 11/4 with Debbie for programming.

## 2020-11-04 ENCOUNTER — ANCILLARY PROCEDURE (OUTPATIENT)
Dept: CT IMAGING | Facility: CLINIC | Age: 75
End: 2020-11-04
Attending: NURSE PRACTITIONER
Payer: MEDICARE

## 2020-11-04 ENCOUNTER — OFFICE VISIT (OUTPATIENT)
Dept: NEUROLOGY | Facility: CLINIC | Age: 75
End: 2020-11-04
Payer: MEDICARE

## 2020-11-04 VITALS
OXYGEN SATURATION: 96 % | BODY MASS INDEX: 29.95 KG/M2 | HEART RATE: 74 BPM | WEIGHT: 197 LBS | SYSTOLIC BLOOD PRESSURE: 136 MMHG | DIASTOLIC BLOOD PRESSURE: 70 MMHG

## 2020-11-04 DIAGNOSIS — G25.0 ESSENTIAL TREMOR: ICD-10-CM

## 2020-11-04 DIAGNOSIS — R25.1 TREMOR: ICD-10-CM

## 2020-11-04 DIAGNOSIS — R25.1 TREMOR: Primary | ICD-10-CM

## 2020-11-04 PROCEDURE — 99215 OFFICE O/P EST HI 40 MIN: CPT | Mod: 25 | Performed by: NURSE PRACTITIONER

## 2020-11-04 PROCEDURE — 70450 CT HEAD/BRAIN W/O DYE: CPT | Performed by: RADIOLOGY

## 2020-11-04 PROCEDURE — 95984 ALYS BRN NPGT PRGRMG ADDL 15: CPT | Performed by: NURSE PRACTITIONER

## 2020-11-04 PROCEDURE — 95983 ALYS BRN NPGT PRGRMG 15 MIN: CPT | Performed by: NURSE PRACTITIONER

## 2020-11-04 RX ORDER — ASPIRIN 81 MG/1
81 TABLET ORAL DAILY
COMMUNITY

## 2020-11-04 ASSESSMENT — PAIN SCALES - GENERAL: PAINLEVEL: NO PAIN (0)

## 2020-11-04 NOTE — PROGRESS NOTES
"MOVEMENT DISORDERS CLINIC    PATIENT: Duane Faymoville    : 1945    RICHARD: 2020    REASON FOR VISIT: Left hemisphere monopolar review & initial deep brain stimulation (DBS) programming for Essential Tremor.     HPI:  Mr. Duane Faymoville is a 75 year old right handed  male who came to the [facility] neurology clinic for DBS initial programming.  He underwent Left Ventral Intermedius Nucleus of Thalamus (VIM) DBS lead placement on 10/6/2020 and left chest chest infraclavicular Infinity 5 generator placement on 10/13/2020 by Dr. Mckeon.       Lesion effect:  Tremor improved \"a lot\" and has returned a little bit over the last 10 days.  At times the tremor is gone.  Yesterday he wasn't able to pay his bills due to the tremor.    Side effect post DBS lead placement: None.  No gait, speech, mood, or cognitive changes.      Medications for Tremor   None       PHYSICAL EXAM:  Vital Signs:  Blood pressure 136/70, pulse 74, weight 89.4 kg (197 lb), SpO2 96 %.  Body mass index is 29.95 kg/m .    General:  Mr. Bang is a pleasant  male who is well-groomed and well-developed sitting comfortably in the exam room without any distress.  Affect is appropriate.    Incision Site: Clean, dry, and intact    TREMOR ASSESSMENT:  (OFF DBS)  Motor (Performance) Sub-Scale 2020   Assessment Time 9:17 AM   Medication None   DBS - Right Brain None   DBS - Left Brain Off   Head 0.5   Face & Jaw 0   Voice 1   Outstretched - RIGHT 1   Outstretched - LEFT 0.5   Wingbeating - RIGHT 0   Wingbeating - LEFT 0   Kinetic - RIGHT 1.5   Kinetic - LEFT 1.5   Lower Limb - RIGHT 0.5   Lower Limb - LEFT 1   Lower Limb (Max) 1   Spiral - RIGHT 2   Spiral - LEFT 1.5   Handwriting 3   Dot approx - RIGHT 1.5   Dot approx - LEFT 1.5   Trunk (Standing) 0   Total Right 6.5   Total Left 6   Axial 1.5   TOTAL 16.5   Prior: 21.5 on 2020  Gait: Narrow based, reduced arm swing, Gunslinger arm position. Normal stride length.  Able to " perform tandem gait with a single loss of balance.  Able to turn without evidence of imbalance.   Handwriting sample and spiral drawing will be scanned into EPIC.      DBS Interrogation & Analysis:    Implanted generator:  Left chest Infinity 5  Lead target:  Left Ventral Intermedius Nucleus of Thalamus (VIM).    Left Brain  Lead placement date:  10/6/2020  Generator placement date:  10/13/2020    Battery Service Life:  >3.00 volts.    Electrode Impedance Check:   Left Lead: No Problems Found.  See scanned report for impedance details.    MONOPOLAR REVIEW  Left Hemisphere    Contacts Amplitude  mA PW    s Rate  hertz Assessment Adverse Effect   Case+, 2- 0.0 60 130 Right hand postural tremor = 1.  Finger-to-nose (FTN) = 1.5.  NN    1.0   No postural tremor, spiral tighter.  NN    1.5    NN    1.8    Trans tingle right lateral hand    2.0   No postural tremor, spiral improved, gait same but felt improved Same    2.5    Same    3.0   No postural tremor, some tremor in spiral - ? ataxia Same    3.5    NN    4.0   Same kinetic tremor, no postural tremor, spiral same, gait same NN    4.5    Trans hand tingle    5.0   Kinetic tremor improved, ?slight ataxia FTN, spiral same, gait same Same but stronger    5.5    Same    6.0   No postural tremor, swaying of arm when outstretched. Spiral worsened Strong tingling throughout arm and gait off balance   Case+, 3- 0.0 60 130 Spiral 2.5     0.5    NN    1.0   Spiral improved NN    1.5    Slight finger tingle    2.0   Spiral better, slight woozy on standing, ?reduced right arm swing Same    2.5    NN    3.0   Spiral improved NN    3.5   No arm swaying NN    4.0   Gait same, spiral improved Hand tingle    4.5    Same    5.0   No ataxia on FTN, spiral slightly worsened, gait same Hand tingle stronger, feels a buzz and unsteady       __  Monopolar survey showed a wider or high threshold before he experienced side effects at contact 2 and 3.  In contact 2, he seemed to have ataxia,  which became evident when current was increased.  Looking back, it might have started from 3.0 mA when drawing spiral.  Therefore, contact 3 was selected as patient didn't have ataxic tremor.     Left VIM    C +, 3 -  Amplitude:  2.0 mA  PW:  60  s  Rate:  130 Hz       Patient :  Upper Limit: 3.0 mA  Lower Limit: 0.0 mA       __ Went over patient controller with the patient.  He was shown how to check battery, turn DBS on & off, and adjust voltage.  Pt was able to demonstrate independently.    __  Pt was instructed to call if there is any side effects from today's programming or worsening tremor.    __ Will return for DBS programming follow up in 1 month.    Greer Guardado MD  Movement Disorders Fellow    The total amount of time spent with patient in DBS programming was 95 minutes.     BENJIE Whaley, CNP  [facility]     9:10 AM  10:45 AM

## 2020-11-04 NOTE — PATIENT INSTRUCTIONS
We created Program 1 for your right body.  You went home on an amplitude of 2.0.  With your remote you can change the amplitude between 0 and 3.0.  A change of 0.2 is reasonable to make at one time.    Leave your settings as they are for at least one week.  Call to let us know how you are doing.    We encourage you to turn your stimulator off at night and then back on in the morning.    If you ever need an MRI, place your device in MRI mode.    We will see you in 1 month for optimization.

## 2020-11-04 NOTE — Clinical Note
2020       RE: Duane Faymoville  [id number] [address] [facility] [place] [address]     Dear Colleague,    Thank you for referring your patient, Duane Faymoville, to the [facility] NEUROLOGY [facility]IN[facility] [place] at [facility]. Please see a copy of my visit note below.    MOVEMENT DISORDERS CLINIC    PATIENT: Duane Faymoville    : 1945    RICHARD: 2020    REASON FOR VISIT: Left hemisphere monopolar review & initial deep brain stimulation (DBS) programming for Essential Tremor.     HPI:  Mr. Duane Faymoville is a 75 year old right handed  male who came to the [facility] neurology clinic for DBS initial programming.  He underwent Left Ventral Intermedius Nucleus of Thalamus (VIM) DBS lead placement on 10/6/2020 and left chest chest infraclavicular Infinity 5 generator placement on 10/13/2020 by Dr. Mckeon.      Lesion effect:  Tremor improved a lot for a time and has returned a little bit over the last 10 days.  At times the tremor is gone.  Yesterday he wasn't able to pay his bills due to the tremor.    Side effect post DBS lead placement: none       Medications for Tremor   None       PHYSICAL EXAM:  Vital Signs:  Blood pressure 136/70, pulse 74, weight 89.4 kg (197 lb), SpO2 96 %.  Body mass index is 29.95 kg/m .    General:  Mr. Bang is a pleasant  male who is well-groomed and well-developed sitting comfortably in the exam room without any distress.  Affect is appropriate.    Incision Site: clean, dry, and intact    TREMOR ASSESSMENT:  (OFF DBS)  Motor (Performance) Sub-Scale 2020   Assessment Time 9:17 AM   Medication None   DBS - Right Brain None   DBS - Left Brain Off   Head 0.5   Face & Jaw 0   Voice 1   Outstretched - RIGHT 1   Outstretched - LEFT 0.5   Wingbeating - RIGHT 0   Wingbeating - LEFT 0   Kinetic - RIGHT 1.5   Kinetic - LEFT 1.5   Lower Limb - RIGHT 0.5   Lower Limb - LEFT 1   Lower Limb (Max) 1   Spiral - RIGHT  2   Spiral - LEFT 1.5   Handwriting 3   Dot approx - RIGHT 1.5   Dot approx - LEFT 1.5   Trunk (Standing) 0   Total Right 6.5   Total Left 6   Axial 1.5   TOTAL 16.5   Prior: 21.5 on 8/5/2020  Gait: Narrow based, reduced arm swing, Gunslinger arm position. Normal stride length.  Able to perform tandem gait with a single loss of balance.  Able to turn without evidence of imbalance.   Handwriting sample and spiral drawing will be scanned into [employer].      DBS Interrogation & Analysis:    Implanted generator:  Left chest Infinity 5  Lead target:  Left Ventral Intermedius Nucleus of Thalamus (VIM).    Left Brain  Lead placement date:  10/6/2020  Generator placement date:  10/13/2020    Battery Service Life:  >3.00 volts.    Electrode Impedance Check:   Left Lead: No Problems Found.  See scanned report for impedance details.    MONOPOLAR REVIEW  Left Hemisphere    Contacts Amplitude  mA PW    s Rate  hertz Assessment Adverse Effect   Case+, 2- 0.5 60 130  NN    1.0   No postural tremor, spiral tighter NN    1.5    NN    1.8    Trans tingle right lateral hand    2.0   No postural tremor, spiral improved, gait same but felt improved Same    2.5    Same    3.0   No postural tremor, some tremor in spiral Same    3.5    NN    4.0   Same kinetic tremor, no postural tremor, spiral same, gait same NN    4.5    Trans hand tingle    5.0   Kinetic tremor improved, ?slight ataxia FTN, spiral same, gait same Same but stronger    5.5    Same   *** 6.0   No postural tremor, Swaying of arm when outstretched. Spiral worsened Tingle throughout arm and stronger, gait off balance           Case+, 3- 0.0 60 130 Spiral 2.5     0.5    NN    1.0   Spiral improved NN    1.5    Slight finger tingle    2.0   Spiral better, slight woozy on standing, ?reduced right arm swing Same    2.5    NN    3.0   Spiral improved NN    3.5   No arm swaying NN    4.0   Gait same, spiral improved Hand tingle    4.5    Same   *** 5.0   No ataxia on FTN, spiral  slightly worsened, gait same Hand tingle stronger, feels a buzz and unsteady               __  Monopolar survey showed a high AE threshold at contact 2 and 3.  Contact 2 seemed to cause more ataxia.   Based on the monopolar review, patient was programmed as below: -     Left VIM    C +, 3 -  Amplitude:  2.0 mA  PW:  60  s  Rate:  130 Hz       Patient :  Upper Limit: 3.0 mA  Lower Limit: 0.0 mA       __ Went over patient controller with the patient.  He was shown how to check battery, turn DBS on & off, and adjust voltage.  Pt was able to demonstrate independently.    __  Pt was instructed to call if there is any side effects from today's programming or worsening tremor.    __ Will return for DBS programming follow up in 1 month.    The total amount of time spent with patient in DBS programming was *** minutes.     BENJIE Whaley, CNP  [facility]rology [facility]     9:10 AM  10:45 AM          Again, thank you for allowing me to participate in the care of your patient.      Sincerely,    BENJIE Duenas CNP

## 2020-11-09 NOTE — RESULT ENCOUNTER NOTE
Dear Duane,    It was very nice seeing you at your recent visit for initial programming.     Your head CT didn't show any bleeding.  The changes that were noted around the lead are common surgery-related changes.  We can discuss this further at your next visit or you can send us a [employer] message or call us at [phone].    Best Regards,  BENJIE Whaley, CNP  [facility] Neu[facility]  
No

## 2020-12-09 ENCOUNTER — OFFICE VISIT (OUTPATIENT)
Dept: NEUROLOGY | Facility: CLINIC | Age: 75
End: 2020-12-09
Payer: MEDICARE

## 2020-12-09 VITALS
WEIGHT: 195 LBS | OXYGEN SATURATION: 96 % | HEIGHT: 68 IN | DIASTOLIC BLOOD PRESSURE: 72 MMHG | SYSTOLIC BLOOD PRESSURE: 149 MMHG | RESPIRATION RATE: 16 BRPM | BODY MASS INDEX: 29.55 KG/M2 | HEART RATE: 66 BPM

## 2020-12-09 DIAGNOSIS — Z96.89 S/P DEEP BRAIN STIMULATOR PLACEMENT: ICD-10-CM

## 2020-12-09 DIAGNOSIS — G25.0 ESSENTIAL TREMOR: Primary | ICD-10-CM

## 2020-12-09 PROCEDURE — 99214 OFFICE O/P EST MOD 30 MIN: CPT | Mod: 25 | Performed by: NURSE PRACTITIONER

## 2020-12-09 PROCEDURE — 95970 ALYS NPGT W/O PRGRMG: CPT | Performed by: NURSE PRACTITIONER

## 2020-12-09 ASSESSMENT — MIFFLIN-ST. JEOR: SCORE: 1604.01

## 2020-12-09 ASSESSMENT — PAIN SCALES - GENERAL: PAINLEVEL: NO PAIN (0)

## 2020-12-09 NOTE — NURSING NOTE
Chief Complaint   Patient presents with     DBS Programming     [facility] DEEP BRAIN STIMULATION - Programming     Selvin Dockery

## 2020-12-09 NOTE — PATIENT INSTRUCTIONS
December 9, 2020    Dear Pj Duane M Irvingselvin,    Thank you for coming today.  During your visit, we have discussed the following:     __ Your DBS electrical system function (impedance) is normal. The battery life is also good.  __ No DBS programming changes today.  __  Your current is set at 2.0 mA   You have a range from 0.0 - 3.0 mA  __  If the right hand tremor gets worse, you can increase your left brain DBS to 2.2 mA and continue to increase it by 0.2 mA increments.   __ Please call if your symptoms worsen or with any questions.  __ If the left hand tremor gets worse, call us.   __ Return 6 - 9 months to check your DBS.  __  You may also return to see Dr. Dey in 6 months.     To contact our clinic, you may call [phone] or use [employer] message.     It's good to see you!     BENJIE Whaley, CNP  [facility] Neurology [facility]

## 2021-10-27 ENCOUNTER — TELEPHONE (OUTPATIENT)
Dept: NEUROPSYCHOLOGY | Facility: CLINIC | Age: 76
End: 2021-10-27

## 2021-10-27 NOTE — TELEPHONE ENCOUNTER
Left a detailed message for the patient to call back if he would like to reschedule his appointment on 11/2/21 at 12:30 PM to tomorrow at 8 AM. Informed the patient in the message, to call back as soon as possible if he would like to switch to 8 AM tomorrow.

## 2021-11-02 ENCOUNTER — OFFICE VISIT (OUTPATIENT)
Dept: NEUROPSYCHOLOGY | Facility: CLINIC | Age: 76
End: 2021-11-02
Attending: NURSE PRACTITIONER
Payer: MEDICARE

## 2021-11-02 DIAGNOSIS — R25.1 TREMOR: ICD-10-CM

## 2021-11-02 DIAGNOSIS — G25.0 ESSENTIAL TREMOR: Primary | ICD-10-CM

## 2021-11-02 DIAGNOSIS — F09 MENTAL DISORDER DUE TO GENERAL MEDICAL CONDITION: ICD-10-CM

## 2021-11-02 PROCEDURE — 96138 PSYCL/NRPSYC TECH 1ST: CPT

## 2021-11-02 PROCEDURE — 96139 PSYCL/NRPSYC TST TECH EA: CPT

## 2021-11-02 PROCEDURE — 96133 NRPSYC TST EVAL PHYS/QHP EA: CPT

## 2021-11-02 PROCEDURE — 90791 PSYCH DIAGNOSTIC EVALUATION: CPT

## 2021-11-02 PROCEDURE — 96132 NRPSYC TST EVAL PHYS/QHP 1ST: CPT

## 2021-11-02 NOTE — PROGRESS NOTES
"NEUROPSYCHOLOGICAL EVALUATION    RELEVANT HISTORY AND REASON FOR REFERRAL    Duane Faymoville is a 76 year old, right handed, retired  with 12 years of formal education. Information was obtained via interview with the patient and review of his medical records, including a prior neuropsychological evaluation. Records indicate a history of essential tremor. He is s/p left VIM DBS lead placement on 10/6/2020. He has had good benefit to the tremor in his right body. There was a \"wait and see\" approach to surgery for his left body, depending on response to surgery and whether his left hand tremor was bothersome enough. As of his most recent Movement Disorders Clinic visit on 12/9/2020, he was not ready to proceed to surgery on the other side. He was referred for neuropsychological evaluation by BENJIE Dial, RANJEET, in one year follow up to surgery, for further characterization of any cognitive difficulties and to evaluate mood, to assist with treatment planning.      Mr. Bang completed a baseline neuropsychological evaluation under my direction on 8/13 and 8/24/2020. Full details regarding his history and the findings of the evaluation can be found in the reports from those dates. Briefly, results indicated impairments in retrieval on one list learning task, with performance otherwise falling well within normal limits across cognitive domains, in the average to high average range. He did not endorse significant depressive symptomatology, anxiety, or apathy.    CLINICAL INTERVIEW FINDINGS    Upon interview, Mr. Bang stated that surgery went very well for him.  He had good benefit to his right hand.  Having surgery for his left side is not a priority for him, as he is pleased with the outcome of the right hand surgery.    Mr. Bang feels that his attention and concentration are deteriorating over time, although he continues to enjoy reading and is able to focus on what he is reading. "  It is taking him longer to do sudoku puzzles daily, but he is not having greater difficulty in this regard.  It also takes him longer to think of names of familiar people.  He is not forgetting what others have said, nor is he noticing difficulty with word finding or decision-making.  He lives with his wife, and manages their finances, his medications, and driving, apparently without difficulty.  He handles his personal cares independently.    When asked to describe his mood, Mr. Bang stated that it is fine.  He does not feel depressed, hopeless, helpless, or guilty.  He is not feeling anxious.  His sleep has always been sporadic.  He may sleep for 4 or 4-1/2 hours and then he is wide awake, and only occasionally goes back to sleep.  He has not been diagnosed with sleep apnea.  He is drowsy during the day, and tends to fall asleep if no one is talking to him.  His appetite has been good and he has not had any weight changes.  His energy level, interest level, and motivation are good.  He denied visual or auditory hallucinations.  He denied suicidal ideation or any history of suicide attempts.    Mr. Bang consumes alcohol rarely.  He has had 1 alcoholic beverage in the last month.  He prefers to be alert and ready to go the next day.  He denied illicit drug use or tobacco use.    Since his last evaluation, Mr. Bang has not had any major illnesses or injuries.  He was never diagnosed with Covid.  His balance is not as good as it used to be.  He would like his balance and flexibility to improve, and in fact this is his most pressing concern.  He was on a bike a few weeks ago, and noticed that his balance was not as good.  He was on difficult terrain, however.  He denied unilateral weakness or numbness.  He has not been bothered by headaches or other aches or pains.    PAST MEDICAL HISTORY: Medical records indicate a history of benign prostatic hyperplasia, colonic polyps, hyperlipidemia, impingement  syndrome of right shoulder, thrombocytopenic disorder, type 2 diabetes mellitus, tremor.    CURRENT MEDICATIONS:  Include senna-docusate, metformin, enalapril, atorvastatin, aspirin 81 mg.    FAMILY MEDICAL HISTORY:  Significant for a mother who  in 2018 with dementia, and many family members who  in their 50s of heart disease, who used alcohol excessively and otherwise did not care for their health over time.    BEHAVIORAL OBSERVATIONS    During the evaluation, Mr. Bang was pleasant, cooperative, and seemed to understand the instructions. He was alert and oriented to person, place, and time. He fell asleep in the waiting room before this appointment, but did not appear drowsy during the evaluation itself. No tremors were observed clinically. Mood was euthymic. Speech was fluent, with normal articulation, volume, and rate. Spontaneous conversation was present and appropriate. Internal performance validity measures fell within normal limits. The results are believed to accurately reflect his current level of functioning.    MEASURES ADMINISTERED    The following measures were administered by a trained psychometrist, under the direct supervision of a licensed psychologist.    Subtests of the Wechsler Adult Intelligence Scale-4; subtests of the Wechsler Memory Scale-Revised; Flowers Verbal Learning Test-Revised, Form 6; Brief Visual Memory Test - Revised, Form 4; Victoria Naming Test; D-KEFS Verbal Fluency, Alternate Form; Trail Making Test; Stroop; Wisconsin Card Sorting Test - 64; Medina Judgement of Line Orientation Form V; Clock Drawing; Dementia Rating Scale - 2 (DRS-2) Standard Form;  MoCA v. 7.3; Cornell Depression Inventory-2 (BDI-2), HAM-D, HAM-A, Apathy Scale, RBDSQ; QUEST, ESS.     RESULTS AND INTERPRETATION    Overall intellectual functioning was estimated to fall in the above average range, above premorbid estimates of average based on single word reading abilities administered at his prior  evaluation. Performance on a screening measure of dementia was above average (DRS-2 Total Score = 143/144). Performance on the MoCA fell within normal limits (29/30), with an error on a memory task.     Confrontation naming was above average for his age. Ability to comprehend and articulate responses to complex social situations was above average. Verbal abstract reasoning was average. Letter fluency and switching fluency were average. Generative naming to category was low average.    Attention span was average for his age. Divided attention was average. Performance on a measure of distractibility was average. Psychomotor processing speed was average.     Basic visual perception, including matching lines and angles, was exceptionally high. Construction of a clock fell within normal limits. Nonverbal deductive reasoning was high average.    Novel problem solving, including the ability to generate strategies and solutions, fell within normal limits for his age and level of education.     Immediate and 30 minute delayed recall of verbal narrative material was above average. On a multiple trial list learning task, immediate recall was average, with average retention (75%) of the learned material following a 25 minute delay. Immediate recall of visual material was low average, with exceptionally low recall following a 25 minute delay. Recognition memory on this task, however, included no errors.    On the BDI-2, a self-report questionnaire, he did not report significant depressive symptomatology. He did not endorse significant apathy on a scale. He did not endorse dream enactment behavior on a questionnaire, although he did endorse excessive daytime sleepiness.    IMPRESSIONS AND RECOMMENDATIONS    Duane Faymoville is a 76 year old man with a history of essential tremor, who is s/p left VIM DBS lead implantation in October 2020, with good benefit to right-body tremor. He is being seen in one year follow up. His baseline  neuropsychological evaluation in August 2020 suggested difficulty with retrieval on one list learning task, with performance otherwise falling well within normal limits across cognitive domains.     Results of today's evaluation indicate performance that falls almost entirely within normal limits across cognitive domains, generally in the average to above average range. Verbal memory fell well within normal limits. He had difficulty with retrieval on a visual memory task. He endorsed excessive daytime sleepiness, but did not endorse significant depressive symptomatology, apathy, or anxiety.    Compared to his baseline evaluation in 2020, he has had improvements in some aspects of expressive language. He had a decline on a measure of semantic fluency, although other aspects of fluency remained stable. He had significant improvements on measures of verbal memory, and a decline retrieval of visual information. Psychomotor processing speed improved.    This pattern of performance does not reflect dementia at this time. There is no consistent decline in cognitive functioning over time. Memory is variable between evaluations, with declines on one task of visual memory but significant improvement on tasks of verbal memory. Otherwise, performance has generally either remained stable or improved across cognitive domains. Variable memory may reflect nonnrestorative sleep. He is sleeping poorly at night, a longstanding problem.  He is sleepy during the day, and in fact, fell asleep in the waiting room prior to this appointment.  If not already considered, perhaps he would benefit from further evaluation of sleep and treatment recommendations.    Mr. Bang is not currently considering surgery to address tremor in his left hand, as he is pleased with the results of the first surgery. Should he change his mind over the next year, there are no neurocognitive contraindications to him undergoing surgery.    In terms of daily  functioning, Mr. Bang is not experiencing cognitive difficulties that might interfere with his judgement or ability to follow through with treatment recommendations. He may remember information best when it is presented verbally; otherwise, he may benefit from the use of written reminders or checklists. He stated that his most pressing concern is that he would like his balance and flexibility to improve.  He is active and enjoys biking.  If not already considered, perhaps he would benefit from referral to physical therapy or discussion with his physicians about other options.     Results may serve as an updated baseline of his neurocognitive functioning. The evaluation may be repeated in the future for comparison should a change in mental status occur.      Chiara Haque, Ph.D., ABPP  Licensed Psychologist, [place] [id number]  Board Certified in Clinical Neuropsychology      Time spent: One unit of professional time, including interview (CPT 38543). 60 minutes (1 unit) neuropsychological testing evaluation by licensed and board-certified neuropsychologist, including integration of patient data, interpretation of standardized test results and clinical data, clinical decision-making, treatment planning, report, and interactive feedback to the patient, first hour (CPT 37166). 58 minutes (1 units) of neuropsychological testing evaluation by licensed and board-certified neuropsychologist, including integration of patient data, interpretation of standardized test results and clinical data, clinical decision-making, treatment planning, report, and interactive feedback to the patient, subsequent hours (CPT 21341). 30 minutes of neuropsychological test administration and scoring by technician, first 30 minutes (CPT 11213). 155 additional minutes (5 units) neuropsychological test administration and scoring by technician, subsequent 30 minutes (CPT 41855). ICD-10 Diagnoses: G25; F06.8.

## 2021-11-02 NOTE — LETTER
"11/2/2021      RE: Duane M Faymoville  [address] [facility] [address] [place] [address]       NEUROPSYCHOLOGICAL EVALUATION    RELEVANT HISTORY AND REASON FOR REFERRAL    Duane Faymoville is a 76 year old, right handed, retired  with 12 years of formal education. Information was obtained via interview with the patient and review of his medical records, including a prior neuropsychological evaluation. Records indicate a history of essential tremor. He is s/p left VIM DBS lead placement on 10/6/2020. He has had good benefit to the tremor in his right body. There was a \"wait and see\" approach to surgery for his left body, depending on response to surgery and whether his left hand tremor was bothersome enough. As of his most recent [facility] Clinic visit on 12/9/2020, he was not ready to proceed to surgery on the other side. He was referred for neuropsychological evaluation by BENJIE Dial, RANJEET, in one year follow up to surgery, for further characterization of any cognitive difficulties and to evaluate mood, to assist with treatment planning.      Mr. Bang completed a baseline neuropsychological evaluation under my direction on 8/13 and 8/24/2020. Full details regarding his history and the findings of the evaluation can be found in the reports from those dates. Briefly, results indicated impairments in retrieval on one list learning task, with performance otherwise falling well within normal limits across cognitive domains, in the average to high average range. He did not endorse significant depressive symptomatology, anxiety, or apathy.    CLINICAL INTERVIEW FINDINGS    Upon interview, Mr. Bang stated that surgery went very well for him.  He had good benefit to his right hand.  Having surgery for his left side is not a priority for him, as he is pleased with the outcome of the right hand surgery.    Mr. Bang feels that his attention and concentration " are deteriorating over time, although he continues to enjoy reading and is able to focus on what he is reading.  It is taking him longer to do sudoku puzzles daily, but he is not having greater difficulty in this regard.  It also takes him longer to think of names of familiar people.  He is not forgetting what others have said, nor is he noticing difficulty with word finding or decision-making.  He lives with his wife, and manages their finances, his medications, and driving, apparently without difficulty.  He handles his personal cares independently.    When asked to describe his mood, Mr. Bang stated that it is fine.  He does not feel depressed, hopeless, helpless, or guilty.  He is not feeling anxious.  His sleep has always been sporadic.  He may sleep for 4 or 4-1/2 hours and then he is wide awake, and only occasionally goes back to sleep.  He has not been diagnosed with sleep apnea.  He is drowsy during the day, and tends to fall asleep if no one is talking to him.  His appetite has been good and he has not had any weight changes.  His energy level, interest level, and motivation are good.  He denied visual or auditory hallucinations.  He denied suicidal ideation or any history of suicide attempts.    Mr. Bang consumes alcohol rarely.  He has had 1 alcoholic beverage in the last month.  He prefers to be alert and ready to go the next day.  He denied illicit drug use or tobacco use.    Since his last evaluation, Mr. Bang has not had any major illnesses or injuries.  He was never diagnosed with Covid.  His balance is not as good as it used to be.  He would like his balance and flexibility to improve, and in fact this is his most pressing concern.  He was on a bike a few weeks ago, and noticed that his balance was not as good.  He was on difficult terrain, however.  He denied unilateral weakness or numbness.  He has not been bothered by headaches or other aches or pains.    PAST MEDICAL HISTORY:  Medical records indicate a history of benign prostatic hyperplasia, colonic polyps, hyperlipidemia, impingement syndrome of right shoulder, thrombocytopenic disorder, type 2 diabetes mellitus, tremor.    CURRENT MEDICATIONS:  Include senna-docusate, metformin, enalapril, atorvastatin, aspirin 81 mg.    FAMILY MEDICAL HISTORY:  Significant for a mother who  in 2018 with dementia, and many family members who  in their 50s of heart disease, who used alcohol excessively and otherwise did not care for their health over time.    BEHAVIORAL OBSERVATIONS    During the evaluation, Mr. Bang was pleasant, cooperative, and seemed to understand the instructions. He was alert and oriented to person, place, and time. He fell asleep in the waiting room before this appointment, but did not appear drowsy during the evaluation itself. No tremors were observed clinically. Mood was euthymic. Speech was fluent, with normal articulation, volume, and rate. Spontaneous conversation was present and appropriate. Internal performance validity measures fell within normal limits. The results are believed to accurately reflect his current level of functioning.    MEASURES ADMINISTERED    The following measures were administered by a trained psychometrist, under the direct supervision of a licensed psychologist.    Subtests of the Wechsler Adult Intelligence Scale-4; subtests of the Wechsler Memory Scale-Revised; Flowers Verbal Learning Test-Revised, Form 6; Brief Visual Memory Test - Revised, Form 4; Leander Naming Test; D-KEFS Verbal Fluency, Alternate Form; Trail Making Test; Stroop; Wisconsin Card Sorting Test - 64; Medina Judgement of Line Orientation Form V; Clock Drawing; Dementia Rating Scale - 2 (DRS-2) Standard Form;  MoCA v. 7.3; Cornell Depression Inventory-2 (BDI-2), HAM-D, HAM-A, Apathy Scale, RBDSQ; QUEST, ESS.     RESULTS AND INTERPRETATION    Overall intellectual functioning was estimated to fall in the above average  range, above premorbid estimates of average based on single word reading abilities administered at his prior evaluation. Performance on a screening measure of dementia was above average (DRS-2 Total Score = 143/144). Performance on the MoCA fell within normal limits (29/30), with an error on a memory task.     Confrontation naming was above average for his age. Ability to comprehend and articulate responses to complex social situations was above average. Verbal abstract reasoning was average. Letter fluency and switching fluency were average. Generative naming to category was low average.    Attention span was average for his age. Divided attention was average. Performance on a measure of distractibility was average. Psychomotor processing speed was average.     Basic visual perception, including matching lines and angles, was exceptionally high. Construction of a clock fell within normal limits. Nonverbal deductive reasoning was high average.    Novel problem solving, including the ability to generate strategies and solutions, fell within normal limits for his age and level of education.     Immediate and 30 minute delayed recall of verbal narrative material was above average. On a multiple trial list learning task, immediate recall was average, with average retention (75%) of the learned material following a 25 minute delay. Immediate recall of visual material was low average, with exceptionally low recall following a 25 minute delay. Recognition memory on this task, however, included no errors.    On the BDI-2, a self-report questionnaire, he did not report significant depressive symptomatology. He did not endorse significant apathy on a scale. He did not endorse dream enactment behavior on a questionnaire, although he did endorse excessive daytime sleepiness.    IMPRESSIONS AND RECOMMENDATIONS    Duane Faymoville is a 76 year old man with a history of essential tremor, who is s/p left VIM DBS lead implantation  in October 2020, with good benefit to right-body tremor. He is being seen in one year follow up. His baseline neuropsychological evaluation in August 2020 suggested difficulty with retrieval on one list learning task, with performance otherwise falling well within normal limits across cognitive domains.     Results of today's evaluation indicate performance that falls almost entirely within normal limits across cognitive domains, generally in the average to above average range. Verbal memory fell well within normal limits. He had difficulty with retrieval on a visual memory task. He endorsed excessive daytime sleepiness, but did not endorse significant depressive symptomatology, apathy, or anxiety.    Compared to his baseline evaluation in 2020, he has had improvements in some aspects of expressive language. He had a decline on a measure of semantic fluency, although other aspects of fluency remained stable. He had significant improvements on measures of verbal memory, and a decline retrieval of visual information. Psychomotor processing speed improved.    This pattern of performance does not reflect dementia at this time. There is no consistent decline in cognitive functioning over time. Memory is variable between evaluations, with declines on one task of visual memory but significant improvement on tasks of verbal memory. Otherwise, performance has generally either remained stable or improved across cognitive domains. Variable memory may reflect nonnrestorative sleep. He is sleeping poorly at night, a longstanding problem.  He is sleepy during the day, and in fact, fell asleep in the waiting room prior to this appointment.  If not already considered, perhaps he would benefit from further evaluation of sleep and treatment recommendations.    Mr. Bang is not currently considering surgery to address tremor in his left hand, as he is pleased with the results of the first surgery. Should he change his mind over  the next year, there are no neurocognitive contraindications to him undergoing surgery.    In terms of daily functioning, Mr. Bang is not experiencing cognitive difficulties that might interfere with his judgement or ability to follow through with treatment recommendations. He may remember information best when it is presented verbally; otherwise, he may benefit from the use of written reminders or checklists. He stated that his most pressing concern is that he would like his balance and flexibility to improve.  He is active and enjoys biking.  If not already considered, perhaps he would benefit from referral to physical therapy or discussion with his physicians about other options.     Results may serve as an updated baseline of his neurocognitive functioning. The evaluation may be repeated in the future for comparison should a change in mental status occur.      Chiara Haque, Ph.D., [facility]  Licensed Psychologist, LP [id number]  Board Certified in Clinical Neuropsychology      Time spent: One unit of professional time, including interview (CPT 04670). 60 minutes (1 unit) neuropsychological testing evaluation by licensed and board-certified neuropsychologist, including integration of patient data, interpretation of standardized test results and clinical data, clinical decision-making, treatment planning, report, and interactive feedback to the patient, first hour (CPT 53754). 58 minutes (1 units) of neuropsychological testing evaluation by licensed and board-certified neuropsychologist, including integration of patient data, interpretation of standardized test results and clinical data, clinical decision-making, treatment planning, report, and interactive feedback to the patient, subsequent hours (CPT 09624). 30 minutes of neuropsychological test administration and scoring by technician, first 30 minutes (CPT 79325). 155 additional minutes (5 units) neuropsychological test administration and scoring by technician,  subsequent 30 minutes (CPT 18500). ICD-10 Diagnoses: G25; F06.8.      Chiara Haque, PhD LP

## 2021-11-03 NOTE — NURSING NOTE
The patient was seen for neuropsychological evaluation at the request of Dr. Cole Leavitt, for the purposes of diagnostic clarification and treatment planning. 185 minutes of test administration and scoring were provided by this writer, Celso Meng. Please see Dr. Chiara Haque's report for a full interpretation of the findings.

## 2023-07-18 NOTE — PROGRESS NOTES
"[facility] CLINIC    PATIENT: Duane M Faymoville    : 1945    RICHARD: 2020    REASON FOR VISIT: DBS Programming follow up for Essential Tremor (ET).     HPI:  Mr. Duane M Faymoville is a 75 year old right - handed  male who came to the [facility] neurology clinic by himself for DBS programming optimization.  He is s/p Left Ventral Intermedius Nucleus of Thalamus (VIM) DBS lead placement on 10/6/2020 and left chest infraclavicular Infinity 5 generator placement on 10/13/2020 by Dr. Mckeon.      He was last seen in the clinic on 2020 for initial programming. Since then, he reports things are going \"very well.\" He turns off his DBS at night and turns it on when he does something that reminds him to turn it on. He doesn't turn on his DBS right away when he gets up as he may not need it. He has not adjusted his DBS as he didn't need to. He wanted to know the current/voltage increments he should increase if his right hand tremors get worse.      Per the DBS Consensus meeting decision, 2020, his second side surgery was  a wait and see,' which was to be determined by his response to the 1st side surgery and if the left hand tremor (untreated side) was bothersome enough to have the surgery. \" He reports that the left hand tremor is rare.  The tremor is not interfering with ADLs.  If the left hand tremors, he switch it to the right hand. He said, \"it's wait and see\" and he is not ready to pursue the 2nd side surgery.    He reports his mood is improved.  No gait, speech, memory issues post DBS or programming.      Medications for Tremor   None.      PHYSICAL EXAM:    Vital Signs: Blood pressure (!) 149/72, pulse 66, resp. rate 16, height 1.727 m (5' 8\"), weight 88.5 kg (195 lb), SpO2 96 %. Body mass index is 29.65 kg/m .    General:  Mr. Bang is a pleasant male who is well-groomed and well-developed sitting comfortably in the exam room without any distress.  Affect is " "appropriate.    TREMOR ASSESSMENT:  Motor (Performance) Sub-Scale 12/9/2020   Assessment Time 10:48 AM   Medication None   DBS - Right Brain None   DBS - Left Brain On   Head 0.5   Face & Jaw 0   Voice 1   Outstretched - RIGHT 0   Outstretched - LEFT 0.5   Wingbeating - RIGHT 0   Wingbeating - LEFT 0   Kinetic - RIGHT 0   Kinetic - LEFT 1.5   Lower Limb - RIGHT 0.5   Lower Limb - LEFT 0.5   Lower Limb (Max) 0.5   Spiral - RIGHT 1   Spiral - LEFT 1.5   Handwriting 1.5   Dot approx - RIGHT 1   Dot approx - LEFT 1.5   Trunk (Standing) 0   Total Right 2.5   Total Left 5.5   Axial 1.5   TOTAL 10.5     DBS Interrogation & Analysis:  Implanted generator:  Left chest Infinity 5  Lead target:  Left Ventral Intermedius Nucleus of Thalamus (VIM)    Left Brain  Lead placement date:  10/6/2020  Generator placement date:  10/13/2020     C +, 3 -  Amplitude:  2.0 mA  PW:  60  s  Rate:  130 Hz    Therapy impedance:  800 Ohms  Battery Service Life:  3.0 volts.     Patient :  Upper Limit: 3.0 mA  Lower Limit: 0.0 mA    Electrode Impedance Check:  Left Lead: No Problems Found.   See scanned report for impedance details.    ASSESSMENT/PLAN:    Tremors:  Patient is s/p unilateral Vim DBS implantation to improve right hand tremors. He is pleased with the outcome. The left hand tremor is \"rare\" and not interfering with activities.    __ He was informed to increase the current by 0.2 mA increments PRN to improve tremors.   __  He was told that he has a range from 0.0 - 3.0 mA  __ No DBS programming changes today.  __ Encouraged to call if symptoms worsen or with any questions.  __ He was also encouraged to contact us if the left hand tremor gets worse.  __ Recommended to return in 6 - 9 months to check your DBS.  __ Also recommended to return to see Dr. Dey in 6 months.  He opted to wait making appointment at this time.     The total amount of time spent with the patient was 40 minutes; 15 min in DBS interrogation & " analysis and 25 min in addressing ET & addressing questions.  Over 50% of the time was spent in counseling & coordination of care.      BENJIE Whaley, CNP  [facility]     10:35 AM  11:15 AM     Graft Cartilage Fenestration Text: The cartilage was fenestrated with a 1 or 2mm punch biopsy to help facilitate graft survival and healing.

## (undated) DEVICE — PREP SKIN SCRUB TRAY 4461A

## (undated) DEVICE — PREP CHLORAPREP 26ML TINTED HI-LITE ORANGE 930815

## (undated) DEVICE — SU VICRYL 0 UR-6 27" J603H

## (undated) DEVICE — DRAPE MAYO STAND 23X54 8337

## (undated) DEVICE — SYR 10ML FINGER CONTROL W/O NDL 309695

## (undated) DEVICE — SOL NACL 0.9% IRRIG 1000ML BOTTLE 2F7124

## (undated) DEVICE — COVER CAMERA VIDEO LASER 9X96" 04-CC227

## (undated) DEVICE — SU MONOCRYL 4-0 PS-2 27" UND Y426H

## (undated) DEVICE — DECANTER VIAL 2006S

## (undated) DEVICE — SU ETHIBOND 2-0 SHDA 30" X563H

## (undated) DEVICE — ESU GROUND PAD ADULT W/CORD E7507

## (undated) DEVICE — PREP CHLORAPREP CLEAR 3ML 260400

## (undated) DEVICE — Device

## (undated) DEVICE — PAD CHUX UNDERPAD 23X24" 7136

## (undated) DEVICE — HEADRING POINTS STEREOTACTIC DHRSL

## (undated) DEVICE — LINEN TOWEL PACK X6 WHITE 5487

## (undated) DEVICE — GLOVE PROTEXIS MICRO 7.5  2D73PM75

## (undated) DEVICE — NDL BLUNT 18GA 1" W/O FILTER 305181

## (undated) DEVICE — SPONGE SURGIFOAM 100 1974

## (undated) DEVICE — DRAPE U SPLIT 74X120" 29440

## (undated) DEVICE — PACK SET-UP STD 9102

## (undated) DEVICE — GLOVE PROTEXIS BLUE W/NEU-THERA 8.0  2D73EB80

## (undated) DEVICE — PACK NEURO MINOR UMMC SNE32MNMU4

## (undated) DEVICE — LABEL MEDICATION SYSTEM 3303-P

## (undated) DEVICE — ESU GROUND PAD ADULT REM W/15' CORD E7507DB

## (undated) DEVICE — ESU PENCIL W/ROCKER SWITCH BLADE HOLSTER E2350HDB

## (undated) DEVICE — LINEN TOWEL PACK X5 5464

## (undated) DEVICE — CABLE 5 CHANNEL INPUT  ALPHA OMEGA SYSTEM STR-000642-55

## (undated) DEVICE — CABLE MULTI-LEAD TRIAL 3014ANS

## (undated) DEVICE — DRSG PRIMAPORE 02X3" 7133

## (undated) DEVICE — PREP POVIDONE IODINE SOLUTION 10% 4OZ

## (undated) DEVICE — STRAP UNIVERSAL POSITIONING 2-PIECE 4X47X76" 91-287

## (undated) DEVICE — DRAPE SHEET REV FOLD 3/4 9349

## (undated) DEVICE — SU VICRYL 3-0 SH 8X18" UND J864D

## (undated) DEVICE — SPONGE COTTONOID 1/2X1/2" 20-04S

## (undated) DEVICE — CATH TRAY FOLEY SURESTEP 16FR W/TMP PRB STLK LATEX A319416AM

## (undated) DEVICE — ESU ELEC BLADE 2.75" COATED/INSULATED E1455

## (undated) DEVICE — WIPES FOLEY CARE SURESTEP PROVON DFC100

## (undated) DEVICE — BUR STRK PRECISION ACORN 7.5MM 5220-030-575

## (undated) DEVICE — LIGHT HANDLE X1 31140133

## (undated) DEVICE — PREP POVIDONE IODINE SCRUB 7.5% 4OZ APL82212

## (undated) DEVICE — TUBE GUIDE ALPHA OMEGA SYSTEM STR-007721-00

## (undated) DEVICE — PACK GOWN 3/PK DISP XL SBA32GPFCB

## (undated) DEVICE — ADH SKIN CLOSURE PREMIERPRO EXOFIN 1.0ML 3470

## (undated) DEVICE — SOL WATER IRRIG 1000ML BOTTLE 2F7114

## (undated) DEVICE — ELEC MICROPHONICS-FREE ALPHA OMEGA STR-009080-00

## (undated) DEVICE — APPLICATORS COTTON TIP 6"X2 STERILE LF C15053-006

## (undated) DEVICE — RX BACITRACIN OINTMENT 0.9G 1/32OZ CUR001109

## (undated) DEVICE — LINEN TOWEL PACK X30 5481

## (undated) DEVICE — DRSG STERI STRIP 1/2X4" R1547

## (undated) DEVICE — JELLY LUBRICATING SURGILUBE 2OZ TUBE

## (undated) DEVICE — PREP CHLORAPREP 26ML TINTED ORANGE  260815

## (undated) DEVICE — NDL 25GA 2"  8881200441

## (undated) DEVICE — DRAPE IOBAN ISOLATION VERTICAL 320X21CM 6617

## (undated) DEVICE — DRAPE C-ARM W/STRAPS 42X72" 07-CA104

## (undated) DEVICE — BLADE CLIPPER SGL USE 9680

## (undated) DEVICE — SUTURE BOOTS 051003PBX

## (undated) DEVICE — PREP CHLORHEXIDINE 4% 4OZ (HIBICLENS) 57504

## (undated) RX ORDER — BUPIVACAINE HYDROCHLORIDE 2.5 MG/ML
INJECTION, SOLUTION EPIDURAL; INFILTRATION; INTRACAUDAL
Status: DISPENSED
Start: 2020-10-06

## (undated) RX ORDER — LIDOCAINE HYDROCHLORIDE AND EPINEPHRINE 10; 10 MG/ML; UG/ML
INJECTION, SOLUTION INFILTRATION; PERINEURAL
Status: DISPENSED
Start: 2020-10-06

## (undated) RX ORDER — FENTANYL CITRATE 50 UG/ML
INJECTION, SOLUTION INTRAMUSCULAR; INTRAVENOUS
Status: DISPENSED
Start: 2020-10-13

## (undated) RX ORDER — BUPIVACAINE HYDROCHLORIDE 2.5 MG/ML
INJECTION, SOLUTION EPIDURAL; INFILTRATION; INTRACAUDAL
Status: DISPENSED
Start: 2020-10-13

## (undated) RX ORDER — PROPOFOL 10 MG/ML
INJECTION, EMULSION INTRAVENOUS
Status: DISPENSED
Start: 2020-10-06

## (undated) RX ORDER — LIDOCAINE HYDROCHLORIDE 20 MG/ML
INJECTION, SOLUTION EPIDURAL; INFILTRATION; INTRACAUDAL; PERINEURAL
Status: DISPENSED
Start: 2020-10-06

## (undated) RX ORDER — LIDOCAINE HYDROCHLORIDE AND EPINEPHRINE 10; 10 MG/ML; UG/ML
INJECTION, SOLUTION INFILTRATION; PERINEURAL
Status: DISPENSED
Start: 2020-10-13

## (undated) RX ORDER — CEFAZOLIN SODIUM 2 G/100ML
INJECTION, SOLUTION INTRAVENOUS
Status: DISPENSED
Start: 2020-10-06

## (undated) RX ORDER — PROPOFOL 10 MG/ML
INJECTION, EMULSION INTRAVENOUS
Status: DISPENSED
Start: 2020-10-13

## (undated) RX ORDER — LIDOCAINE HYDROCHLORIDE 20 MG/ML
JELLY TOPICAL
Status: DISPENSED
Start: 2020-10-06

## (undated) RX ORDER — CEFAZOLIN SODIUM 1 G/3ML
INJECTION, POWDER, FOR SOLUTION INTRAMUSCULAR; INTRAVENOUS
Status: DISPENSED
Start: 2020-10-06

## (undated) RX ORDER — EPHEDRINE SULFATE 50 MG/ML
INJECTION, SOLUTION INTRAMUSCULAR; INTRAVENOUS; SUBCUTANEOUS
Status: DISPENSED
Start: 2020-10-06

## (undated) RX ORDER — CEFAZOLIN SODIUM 2 G/100ML
INJECTION, SOLUTION INTRAVENOUS
Status: DISPENSED
Start: 2020-10-13

## (undated) RX ORDER — SODIUM CHLORIDE 9 MG/ML
INJECTION, SOLUTION INTRAVENOUS
Status: DISPENSED
Start: 2020-10-06